# Patient Record
Sex: FEMALE | Race: OTHER | HISPANIC OR LATINO | URBAN - METROPOLITAN AREA
[De-identification: names, ages, dates, MRNs, and addresses within clinical notes are randomized per-mention and may not be internally consistent; named-entity substitution may affect disease eponyms.]

---

## 2019-08-11 ENCOUNTER — INPATIENT (INPATIENT)
Facility: HOSPITAL | Age: 24
LOS: 1 days | Discharge: HOME | End: 2019-08-13
Attending: INTERNAL MEDICINE | Admitting: INTERNAL MEDICINE
Payer: COMMERCIAL

## 2019-08-11 VITALS
SYSTOLIC BLOOD PRESSURE: 245 MMHG | WEIGHT: 175.05 LBS | TEMPERATURE: 97 F | RESPIRATION RATE: 22 BRPM | HEART RATE: 134 BPM | DIASTOLIC BLOOD PRESSURE: 128 MMHG | OXYGEN SATURATION: 100 % | HEIGHT: 64 IN

## 2019-08-11 DIAGNOSIS — E06.3 AUTOIMMUNE THYROIDITIS: Chronic | ICD-10-CM

## 2019-08-11 DIAGNOSIS — M32.9 SYSTEMIC LUPUS ERYTHEMATOSUS, UNSPECIFIED: Chronic | ICD-10-CM

## 2019-08-11 LAB
ALBUMIN SERPL ELPH-MCNC: 4 G/DL — SIGNIFICANT CHANGE UP (ref 3.5–5.2)
ALBUMIN SERPL ELPH-MCNC: 4.5 G/DL — SIGNIFICANT CHANGE UP (ref 3.5–5.2)
ALP SERPL-CCNC: 113 U/L — SIGNIFICANT CHANGE UP (ref 30–115)
ALP SERPL-CCNC: 129 U/L — HIGH (ref 30–115)
ALT FLD-CCNC: 28 U/L — SIGNIFICANT CHANGE UP (ref 0–41)
ALT FLD-CCNC: 30 U/L — SIGNIFICANT CHANGE UP (ref 0–41)
ANION GAP SERPL CALC-SCNC: 14 MMOL/L — SIGNIFICANT CHANGE UP (ref 7–14)
ANION GAP SERPL CALC-SCNC: 15 MMOL/L — HIGH (ref 7–14)
APAP SERPL-MCNC: <5 UG/ML — LOW (ref 10–30)
APPEARANCE UR: CLEAR — SIGNIFICANT CHANGE UP
APPEARANCE UR: CLEAR — SIGNIFICANT CHANGE UP
AST SERPL-CCNC: 23 U/L — SIGNIFICANT CHANGE UP (ref 0–41)
AST SERPL-CCNC: 48 U/L — HIGH (ref 0–41)
BACTERIA # UR AUTO: NEGATIVE — SIGNIFICANT CHANGE UP
BACTERIA # UR AUTO: NEGATIVE — SIGNIFICANT CHANGE UP
BASOPHILS # BLD AUTO: 0.05 K/UL — SIGNIFICANT CHANGE UP (ref 0–0.2)
BASOPHILS # BLD AUTO: 0.09 K/UL — SIGNIFICANT CHANGE UP (ref 0–0.2)
BASOPHILS NFR BLD AUTO: 0.4 % — SIGNIFICANT CHANGE UP (ref 0–1)
BASOPHILS NFR BLD AUTO: 0.6 % — SIGNIFICANT CHANGE UP (ref 0–1)
BILIRUB SERPL-MCNC: 0.3 MG/DL — SIGNIFICANT CHANGE UP (ref 0.2–1.2)
BILIRUB SERPL-MCNC: 0.3 MG/DL — SIGNIFICANT CHANGE UP (ref 0.2–1.2)
BILIRUB UR-MCNC: NEGATIVE — SIGNIFICANT CHANGE UP
BILIRUB UR-MCNC: NEGATIVE — SIGNIFICANT CHANGE UP
BUN SERPL-MCNC: 12 MG/DL — SIGNIFICANT CHANGE UP (ref 10–20)
BUN SERPL-MCNC: 16 MG/DL — SIGNIFICANT CHANGE UP (ref 10–20)
CALCIUM SERPL-MCNC: 10 MG/DL — SIGNIFICANT CHANGE UP (ref 8.5–10.1)
CALCIUM SERPL-MCNC: 9.4 MG/DL — SIGNIFICANT CHANGE UP (ref 8.5–10.1)
CHLORIDE SERPL-SCNC: 101 MMOL/L — SIGNIFICANT CHANGE UP (ref 98–110)
CHLORIDE SERPL-SCNC: 102 MMOL/L — SIGNIFICANT CHANGE UP (ref 98–110)
CO2 SERPL-SCNC: 23 MMOL/L — SIGNIFICANT CHANGE UP (ref 17–32)
CO2 SERPL-SCNC: 24 MMOL/L — SIGNIFICANT CHANGE UP (ref 17–32)
COLOR SPEC: COLORLESS — SIGNIFICANT CHANGE UP
COLOR SPEC: COLORLESS — SIGNIFICANT CHANGE UP
CREAT SERPL-MCNC: 0.7 MG/DL — SIGNIFICANT CHANGE UP (ref 0.7–1.5)
CREAT SERPL-MCNC: 0.9 MG/DL — SIGNIFICANT CHANGE UP (ref 0.7–1.5)
DIFF PNL FLD: SIGNIFICANT CHANGE UP
DIFF PNL FLD: SIGNIFICANT CHANGE UP
EOSINOPHIL # BLD AUTO: 0.01 K/UL — SIGNIFICANT CHANGE UP (ref 0–0.7)
EOSINOPHIL # BLD AUTO: 0.05 K/UL — SIGNIFICANT CHANGE UP (ref 0–0.7)
EOSINOPHIL NFR BLD AUTO: 0.1 % — SIGNIFICANT CHANGE UP (ref 0–8)
EOSINOPHIL NFR BLD AUTO: 0.4 % — SIGNIFICANT CHANGE UP (ref 0–8)
EPI CELLS # UR: 1 /HPF — SIGNIFICANT CHANGE UP (ref 0–5)
EPI CELLS # UR: 2 /HPF — SIGNIFICANT CHANGE UP (ref 0–5)
ERYTHROCYTE [SEDIMENTATION RATE] IN BLOOD: 17 MM/HR — SIGNIFICANT CHANGE UP (ref 0–20)
ETHANOL SERPL-MCNC: <10 MG/DL — SIGNIFICANT CHANGE UP
ETHANOL SERPL-MCNC: <10 MG/DL — SIGNIFICANT CHANGE UP
GLUCOSE SERPL-MCNC: 101 MG/DL — HIGH (ref 70–99)
GLUCOSE SERPL-MCNC: 116 MG/DL — HIGH (ref 70–99)
GLUCOSE UR QL: NEGATIVE — SIGNIFICANT CHANGE UP
GLUCOSE UR QL: NEGATIVE — SIGNIFICANT CHANGE UP
HCG UR QL: NEGATIVE — SIGNIFICANT CHANGE UP
HCT VFR BLD CALC: 42.5 % — SIGNIFICANT CHANGE UP (ref 37–47)
HCT VFR BLD CALC: 43.9 % — SIGNIFICANT CHANGE UP (ref 37–47)
HGB BLD-MCNC: 14.1 G/DL — SIGNIFICANT CHANGE UP (ref 12–16)
HGB BLD-MCNC: 14.2 G/DL — SIGNIFICANT CHANGE UP (ref 12–16)
HYALINE CASTS # UR AUTO: 1 /LPF — SIGNIFICANT CHANGE UP (ref 0–7)
HYALINE CASTS # UR AUTO: 1 /LPF — SIGNIFICANT CHANGE UP (ref 0–7)
IMM GRANULOCYTES NFR BLD AUTO: 0.4 % — HIGH (ref 0.1–0.3)
IMM GRANULOCYTES NFR BLD AUTO: 0.4 % — HIGH (ref 0.1–0.3)
KETONES UR-MCNC: SIGNIFICANT CHANGE UP
KETONES UR-MCNC: SIGNIFICANT CHANGE UP
LEUKOCYTE ESTERASE UR-ACNC: NEGATIVE — SIGNIFICANT CHANGE UP
LEUKOCYTE ESTERASE UR-ACNC: NEGATIVE — SIGNIFICANT CHANGE UP
LYMPHOCYTES # BLD AUTO: 16.2 % — LOW (ref 20.5–51.1)
LYMPHOCYTES # BLD AUTO: 2.07 K/UL — SIGNIFICANT CHANGE UP (ref 1.2–3.4)
LYMPHOCYTES # BLD AUTO: 2.96 K/UL — SIGNIFICANT CHANGE UP (ref 1.2–3.4)
LYMPHOCYTES # BLD AUTO: 20.7 % — SIGNIFICANT CHANGE UP (ref 20.5–51.1)
MAGNESIUM SERPL-MCNC: 2 MG/DL — SIGNIFICANT CHANGE UP (ref 1.8–2.4)
MCHC RBC-ENTMCNC: 28.5 PG — SIGNIFICANT CHANGE UP (ref 27–31)
MCHC RBC-ENTMCNC: 29.1 PG — SIGNIFICANT CHANGE UP (ref 27–31)
MCHC RBC-ENTMCNC: 32.3 G/DL — SIGNIFICANT CHANGE UP (ref 32–37)
MCHC RBC-ENTMCNC: 33.2 G/DL — SIGNIFICANT CHANGE UP (ref 32–37)
MCV RBC AUTO: 87.8 FL — SIGNIFICANT CHANGE UP (ref 81–99)
MCV RBC AUTO: 88.2 FL — SIGNIFICANT CHANGE UP (ref 81–99)
MONOCYTES # BLD AUTO: 0.68 K/UL — HIGH (ref 0.1–0.6)
MONOCYTES # BLD AUTO: 0.99 K/UL — HIGH (ref 0.1–0.6)
MONOCYTES NFR BLD AUTO: 5.3 % — SIGNIFICANT CHANGE UP (ref 1.7–9.3)
MONOCYTES NFR BLD AUTO: 6.9 % — SIGNIFICANT CHANGE UP (ref 1.7–9.3)
NEUTROPHILS # BLD AUTO: 10.14 K/UL — HIGH (ref 1.4–6.5)
NEUTROPHILS # BLD AUTO: 9.91 K/UL — HIGH (ref 1.4–6.5)
NEUTROPHILS NFR BLD AUTO: 71 % — SIGNIFICANT CHANGE UP (ref 42.2–75.2)
NEUTROPHILS NFR BLD AUTO: 77.6 % — HIGH (ref 42.2–75.2)
NITRITE UR-MCNC: NEGATIVE — SIGNIFICANT CHANGE UP
NITRITE UR-MCNC: NEGATIVE — SIGNIFICANT CHANGE UP
NRBC # BLD: 0 /100 WBCS — SIGNIFICANT CHANGE UP (ref 0–0)
NRBC # BLD: 0 /100 WBCS — SIGNIFICANT CHANGE UP (ref 0–0)
PH UR: 8 — SIGNIFICANT CHANGE UP (ref 5–8)
PH UR: 8 — SIGNIFICANT CHANGE UP (ref 5–8)
PLATELET # BLD AUTO: 328 K/UL — SIGNIFICANT CHANGE UP (ref 130–400)
PLATELET # BLD AUTO: 356 K/UL — SIGNIFICANT CHANGE UP (ref 130–400)
POTASSIUM SERPL-MCNC: 4.1 MMOL/L — SIGNIFICANT CHANGE UP (ref 3.5–5)
POTASSIUM SERPL-MCNC: 5.8 MMOL/L — HIGH (ref 3.5–5)
POTASSIUM SERPL-SCNC: 4.1 MMOL/L — SIGNIFICANT CHANGE UP (ref 3.5–5)
POTASSIUM SERPL-SCNC: 5.8 MMOL/L — HIGH (ref 3.5–5)
PROT SERPL-MCNC: 7.8 G/DL — SIGNIFICANT CHANGE UP (ref 6–8)
PROT SERPL-MCNC: 7.8 G/DL — SIGNIFICANT CHANGE UP (ref 6–8)
PROT UR-MCNC: ABNORMAL
PROT UR-MCNC: ABNORMAL
RBC # BLD: 4.84 M/UL — SIGNIFICANT CHANGE UP (ref 4.2–5.4)
RBC # BLD: 4.98 M/UL — SIGNIFICANT CHANGE UP (ref 4.2–5.4)
RBC # FLD: 12.3 % — SIGNIFICANT CHANGE UP (ref 11.5–14.5)
RBC # FLD: 12.4 % — SIGNIFICANT CHANGE UP (ref 11.5–14.5)
RBC CASTS # UR COMP ASSIST: 5 /HPF — HIGH (ref 0–4)
RBC CASTS # UR COMP ASSIST: 5 /HPF — HIGH (ref 0–4)
SALICYLATES SERPL-MCNC: <0.3 MG/DL — LOW (ref 4–30)
SODIUM SERPL-SCNC: 139 MMOL/L — SIGNIFICANT CHANGE UP (ref 135–146)
SODIUM SERPL-SCNC: 140 MMOL/L — SIGNIFICANT CHANGE UP (ref 135–146)
SP GR SPEC: 1.01 — LOW (ref 1.01–1.02)
SP GR SPEC: 1.01 — SIGNIFICANT CHANGE UP (ref 1.01–1.02)
TROPONIN T SERPL-MCNC: 0.02 NG/ML — HIGH
TROPONIN T SERPL-MCNC: 0.02 NG/ML — HIGH
UROBILINOGEN FLD QL: SIGNIFICANT CHANGE UP
UROBILINOGEN FLD QL: SIGNIFICANT CHANGE UP
WBC # BLD: 12.77 K/UL — HIGH (ref 4.8–10.8)
WBC # BLD: 14.28 K/UL — HIGH (ref 4.8–10.8)
WBC # FLD AUTO: 12.77 K/UL — HIGH (ref 4.8–10.8)
WBC # FLD AUTO: 14.28 K/UL — HIGH (ref 4.8–10.8)
WBC UR QL: 1 /HPF — SIGNIFICANT CHANGE UP (ref 0–5)
WBC UR QL: 1 /HPF — SIGNIFICANT CHANGE UP (ref 0–5)

## 2019-08-11 PROCEDURE — 70544 MR ANGIOGRAPHY HEAD W/O DYE: CPT | Mod: 26,59

## 2019-08-11 PROCEDURE — 70450 CT HEAD/BRAIN W/O DYE: CPT | Mod: 26

## 2019-08-11 PROCEDURE — 99223 1ST HOSP IP/OBS HIGH 75: CPT

## 2019-08-11 PROCEDURE — 70549 MR ANGIOGRAPH NECK W/O&W/DYE: CPT | Mod: 26

## 2019-08-11 PROCEDURE — 70551 MRI BRAIN STEM W/O DYE: CPT | Mod: 26

## 2019-08-11 PROCEDURE — 93306 TTE W/DOPPLER COMPLETE: CPT | Mod: 26

## 2019-08-11 PROCEDURE — 93010 ELECTROCARDIOGRAM REPORT: CPT

## 2019-08-11 PROCEDURE — 71045 X-RAY EXAM CHEST 1 VIEW: CPT | Mod: 26

## 2019-08-11 PROCEDURE — 99291 CRITICAL CARE FIRST HOUR: CPT

## 2019-08-11 RX ORDER — ONDANSETRON 8 MG/1
4 TABLET, FILM COATED ORAL EVERY 8 HOURS
Refills: 0 | Status: DISCONTINUED | OUTPATIENT
Start: 2019-08-11 | End: 2019-08-13

## 2019-08-11 RX ORDER — NICARDIPINE HYDROCHLORIDE 30 MG/1
5 CAPSULE, EXTENDED RELEASE ORAL
Qty: 40 | Refills: 0 | Status: DISCONTINUED | OUTPATIENT
Start: 2019-08-11 | End: 2019-08-12

## 2019-08-11 RX ORDER — LABETALOL HCL 100 MG
100 TABLET ORAL EVERY 8 HOURS
Refills: 0 | Status: DISCONTINUED | OUTPATIENT
Start: 2019-08-11 | End: 2019-08-13

## 2019-08-11 RX ORDER — NOREPINEPHRINE BITARTRATE/D5W 8 MG/250ML
0.05 PLASTIC BAG, INJECTION (ML) INTRAVENOUS
Qty: 16 | Refills: 0 | Status: DISCONTINUED | OUTPATIENT
Start: 2019-08-11 | End: 2019-08-11

## 2019-08-11 RX ORDER — HEPARIN SODIUM 5000 [USP'U]/ML
5000 INJECTION INTRAVENOUS; SUBCUTANEOUS EVERY 12 HOURS
Refills: 0 | Status: DISCONTINUED | OUTPATIENT
Start: 2019-08-11 | End: 2019-08-13

## 2019-08-11 RX ADMIN — HEPARIN SODIUM 5000 UNIT(S): 5000 INJECTION INTRAVENOUS; SUBCUTANEOUS at 18:23

## 2019-08-11 RX ADMIN — Medication 100 MILLIGRAM(S): at 08:05

## 2019-08-11 RX ADMIN — Medication 100 MILLIGRAM(S): at 13:57

## 2019-08-11 RX ADMIN — NICARDIPINE HYDROCHLORIDE 25 MG/HR: 30 CAPSULE, EXTENDED RELEASE ORAL at 05:45

## 2019-08-11 RX ADMIN — Medication 100 MILLIGRAM(S): at 21:23

## 2019-08-11 NOTE — H&P ADULT - NSHPREVIEWOFSYSTEMS_GEN_ALL_CORE

## 2019-08-11 NOTE — H&P ADULT - NSHPLABSRESULTS_GEN_ALL_CORE
LABS:                       14.1   14.28 )-----------( 328      ( 11 Aug 2019 04:07 )             42.5       08-11    139  |  101  |  16  ----------------------------<  116<H>  5.8<H>   |  23  |  0.9    Ca    9.4      11 Aug 2019 04:07    TPro  7.8  /  Alb  4.0  /  TBili  0.3  /  DBili  x   /  AST  48<H>  /  ALT  30  /  AlkPhos  113  08-11    Lactate Trend    CAPILLARY BLOOD GLUCOSE      RADIOLOGY:    < from: CT Head No Cont (08.11.19 @ 04:51) >  IMPRESSION:  No CT evidence of acute intracranial pathology.        EKGS:

## 2019-08-11 NOTE — H&P ADULT - ASSESSMENT
A 24y female with no significant PMH presents to the hospital complaining of right facial numbness. Currently, patient is admitted for hypertensive emergency requiring nicardipine gtt and ICU is consulted.     IMPRESSION:  Hypertensive emergency    PLAN:    CNS:  - avoid sedation  - f/u MRI     HEENT:  - oral care    PULMONARY:  - keep head between 30 - 45 degrees    CARDIOVASCULAR:  - trend CE x 3   --  rx:  nitro gtt or nicardipine gtt (should not be used without hr control first with bb)  --  restart home bp medications  --  urgency: nicardipine 20-40 q8 or captopril [id number] or labetalol 200 q6-12     GI: GI prophylaxis.  Feeding     RENAL:  - f/u UA and UO     INFECTIOUS DISEASE:    HEMATOLOGICAL:  DVT prophylaxis.    ENDOCRINE:  Follow up FS.  Insulin protocol if needed.    MUSCULOSKELETAL:    TIME SPENT:      GI ppx: (  )  DVT ppx: (  )  Activity:  Code status: Full Code (  ) / DNR (  ) / DNI (  )  Disposition:     (  ) Discussion with patient and/or family regarding goals of care  (  ) Discussed Case and Plan with Medical Attending, Name:  A 24y female with no significant PMH presents to the hospital complaining of right facial numbness. Currently, patient is admitted for hypertensive emergency requiring nicardipine gtt and ICU is consulted.     IMPRESSION:  Hypertensive emergency    PLAN:    CNS:  - avoid sedation  - f/u MRI     HEENT:  - oral care    PULMONARY:  - keep head between 30 - 45 degrees    CARDIOVASCULAR:  - trend CE x 3   - c/w nicardipine gtt to maintain SBP < 180   - begin labetolol PO 100mg q8h and increase PRN     GI:   - GI prophylaxis  - Feeding     RENAL:  - f/u UA and UO   - f/u rine metanephrine   - f/u urine tox     INFECTIOUS DISEASE:  - f/u pancultures     HEMATOLOGICAL:    - DVT prophylaxis.    ENDOCRINE:    - Follow up FS  - Insulin protocol if needed.    MUSCULOSKELETAL:  - activity increase as tolerated    ( X ) Discussion with patient and/or family regarding goals of care IMPRESSION:  Hypertensive emergency improving     PLAN:    CNS:  - avoid sedation  - f/u MRI.  FU with neuro     HEENT:  - oral care    PULMONARY:  - keep head between 30 - 45 degrees    CARDIOVASCULAR:  - trend CE x 3   - c/w nicardipine gtt  - begin labetolol PO 100mg q8h     GI:   - GI prophylaxis  - Feeding     RENAL:  - f/u UA and UO   - f/u Urine metanephrine   - f/u urine tox   - Renal eval    INFECTIOUS DISEASE:  - f/u pancultures     HEMATOLOGICAL:    - DVT prophylaxis.    ENDOCRINE:    - Follow up FS  - Insulin protocol if needed.    MUSCULOSKELETAL:  - activity increase as tolerated once BP controlled     MICU for now IMPRESSION:  Hypertensive emergency improving     PLAN:    CNS:  - avoid sedation  - f/u MRI.  FU with neuro     HEENT:  - oral care    PULMONARY:  - keep head between 30 - 45 degrees.  CXR     CARDIOVASCULAR:  - trend CE x 3   - c/w nicardipine gtt  - begin labetolol PO 100mg q8h   - ECHO    GI:   - GI prophylaxis  - Feeding     RENAL:  - f/u UA and UO   - f/u Urine metanephrine   - f/u urine tox   - Renal eval    INFECTIOUS DISEASE:  - f/u pancultures     HEMATOLOGICAL:    - DVT prophylaxis.    ENDOCRINE:    - Follow up FS  - Insulin protocol if needed.    MUSCULOSKELETAL:  - activity increase as tolerated once BP controlled     MICU for now

## 2019-08-11 NOTE — ED ADULT TRIAGE NOTE - CHIEF COMPLAINT QUOTE
Patient states she feels a tingling on the right side of her face and nose since friday afternoon. Patient states she has right upper wisdom teeth that are impacted Patient states she feels a tingling on the right side of her face and nose since friday afternoon. Patient states she has right upper wisdom teeth that are impacted. Patient states she is extremely nervous. Patient states she feels a tingling on the right side of her face and nose since friday afternoon. Patient states she has right upper wisdom teeth that are impacted. Patient states she is extremely nervous. No neurological deficits noted.

## 2019-08-11 NOTE — CONSULT NOTE ADULT - SUBJECTIVE AND OBJECTIVE BOX
Neurology Consult    Patient is a 24y old  Female who presents with a chief complaint of HTN and Right facial numbness    HPI:24F with no significant pmh presents with R facial numbness beginning 2 days ago. Denies other focal numbness or weakness. Does admit to hx of impacted molars bilaterally. Denies fever, chills, n/v/d, CP, SOB, LE pain or swelling. Pt states her facial numbness started friday intermittantly when it returned yesterday she went to hospital, pt denies headaches, blurry vision, numbness /tingling or weakness in the extremities. Pt is currently in ER critical care area on Cardene at 5 for SBP in 250's. Pt SBP came down to 197 but then returned to 225. Pt is currently asymptomatic    PAST MEDICAL & SURGICAL HISTORY:  Impacted molar      FAMILY HISTORY:  Lupus  Hashimoto's disease  Sjorgourn    Social History: (-) x 3    Allergies    Sulfacetamide Sodium (Unknown)    Intolerances        MEDICATIONS  (STANDING):  niCARdipine Infusion 5 mG/Hr (25 mL/Hr) IV Continuous <Continuous>    MEDICATIONS  (PRN):      Review of systems:    Constitutional: No fever, weight loss or fatigue    Eyes: No eye pain or discharge  ENMT:  No difficulty hearing; No sinus or throat pain  Neck: No pain or stiffness  Respiratory: No cough, wheezing, chills or hemoptysis  Cardiovascular: No chest pain, palpitations, shortness of breath, dyspnea on exertion  Gastrointestinal: No abdominal pain, nausea, vomiting or hematemesis; No diarrhea or constipation.   Genitourinary: No dysuria, frequency, hematuria or incontinence  Neurological: As per HPI  Skin: No rashes or lesions   Endocrine: No heat or cold intolerance; No hair loss  Musculoskeletal: No joint pain or swelling  Psychiatric: No depression, anxiety, mood swings  Heme/Lymph: No easy bruising or bleeding gums    Vital Signs Last 24 Hrs  T(C): 36.3 (11 Aug 2019 03:29), Max: 36.3 (11 Aug 2019 03:29)  T(F): 97.4 (11 Aug 2019 03:29), Max: 97.4 (11 Aug 2019 03:29)  HR: 124 (11 Aug 2019 06:50) (112 - 143)  BP: 162/81 (11 Aug 2019 06:50) (162/81 - 245/128)  BP(mean): --  RR: 16 (11 Aug 2019 06:50) (16 - 22)  SpO2: 99% (11 Aug 2019 06:50) (98% - 100%)    Neurologic Examination:  General:  Appearance is consistent with chronologic age.  No abnormal facies.   General: The patient is oriented to person, place, time and date.  Recent and remote memory intact.  Fund of knowledge is intact and normal.  Language with normal repetition, comprehension and naming.  Nondysarthric.    Cranial nerves: intact VA, VFF.  EOMI w/o nystagmus, skew or reported double vision.  PERRL.  No ptosis/weakness of eyelid closure.  Facial sensation is normal with normal bite.  No facial asymmetry.  Hearing grossly intact b/l.  Palate elevates midline.  Tongue midline.  Motor examination:   Normal tone, bulk and range of motion.  No tenderness, twitching, tremors or involuntary movements.  Formal Muscle Strength Testing: (MRC grade R/L) 5/5 UE; 5/5 LE.  No observable drift.  Reflexes:   2+ b/l pectoralis, biceps, triceps, brachioradialis, patella and Achilles.  Plantar response downgoing b/l.  Jaw jerk, Amanuel, clonus absent.  Sensory examination:   Intact to light touch and pinprick, pain, temperature and proprioception and vibration in all extremities.  Cerebellum:   FTN/HKS intact with normal PO in all limbs.  No dysmetria or dysdiadokinesia.  Gait narrow based and normal.    NIH 0 GCS 15  Labs:   CBC Full  -  ( 11 Aug 2019 04:07 )  WBC Count : 14.28 K/uL  RBC Count : 4.84 M/uL  Hemoglobin : 14.1 g/dL  Hematocrit : 42.5 %  Platelet Count - Automated : 328 K/uL  Mean Cell Volume : 87.8 fL  Mean Cell Hemoglobin : 29.1 pg  Mean Cell Hemoglobin Concentration : 33.2 g/dL  Auto Neutrophil # : 10.14 K/uL  Auto Lymphocyte # : 2.96 K/uL  Auto Monocyte # : 0.99 K/uL  Auto Eosinophil # : 0.05 K/uL  Auto Basophil # : 0.09 K/uL  Auto Neutrophil % : 71.0 %  Auto Lymphocyte % : 20.7 %  Auto Monocyte % : 6.9 %  Auto Eosinophil % : 0.4 %  Auto Basophil % : 0.6 %    08-11    139  |  101  |  16  ----------------------------<  116<H>  5.8<H>   |  23  |  0.9    Ca    9.4      11 Aug 2019 04:07    TPro  7.8  /  Alb  4.0  /  TBili  0.3  /  DBili  x   /  AST  48<H>  /  ALT  30  /  AlkPhos  113  08-11    LIVER FUNCTIONS - ( 11 Aug 2019 04:07 )  Alb: 4.0 g/dL / Pro: 7.8 g/dL / ALK PHOS: 113 U/L / ALT: 30 U/L / AST: 48 U/L / GGT: x                   Neuroimaging:  NCHCT: CT Head No Cont:   EXAM:  CT BRAIN            PROCEDURE DATE:  08/11/2019            INTERPRETATION:  CLINICAL HISTORY / REASON FOR EXAM: Right facial numbness    COMPARISON: None     TECHNIQUE: Multiple axial CT images of the head were obtained with   sagittal and coronal reformats from the base of the skull to the vertex   without the administration of IV contrast.      FINDINGS:    The ventricles and cerebral sulci are age-appropriate.    There is no evidence of acute intracranial hemorrhage, mass effect or   midline shift.    Gray-white differentiation is maintained.    There is no fracture to the calvarium. Mastoid air cells are well aerated   bilaterally. The visualized portions of the sinuses are unremarkable.      IMPRESSION:    No CT evidence of acute intracranial pathology.              JOVANY MCCRAY M.D., RESIDENT RADIOLOGIST  This document has been electronically signed.  BIANCA SERNA M.D., ATTENDING RADIOLOGIST  This document has been electronically signed. Aug 11 2019  5:07AM             (08-11-19 @ 04:51)    CT Angiography/Perfusion:  MRI Brain NC:  MRA Head/Neck:  EEG:    Assessment:  This is a 24y Female with h/o     Plan:   -   08-11-19 @ 07:01

## 2019-08-11 NOTE — H&P ADULT - HISTORY OF PRESENT ILLNESS
25 yo F w/ no significant PMH presents with Rt facial numbness beginning 2 days ago. Denies other focal numbness or weakness. Admits to hx of impacted molars bilaterally. Denies fever, chills, n/v/d, CP, SOB, LE pain or swelling, recent travel or exogenous hormone use.    Family hx positive for Lupus, Sjogren's disease (both, mother), and Hashimoto's (aunt)    In the ED, pt was found to be hypertensive at 245/128. Neurology consulted and recommended CTH and MRI head. CTH negative for acute pathology. MRI pending. Nicardipine gtt started and BP decreased and sxs resolved. ICU consulted for hypertensive emergency requiring nicardipine gtt.     Vital Signs Last 24 Hrs  T(C): 36.3 (11 Aug 2019 03:29), Max: 36.3 (11 Aug 2019 03:29)  T(F): 97.4 (11 Aug 2019 03:29), Max: 97.4 (11 Aug 2019 03:29)  HR: 124 (11 Aug 2019 06:30) (112 - 143)  BP: 174/83 (11 Aug 2019 06:30) (174/83 - 245/128)  BP(mean): --  RR: 16 (11 Aug 2019 06:30) (16 - 22)  SpO2: 98% (11 Aug 2019 06:30) (98% - 100%) 23 yo F w/ no significant PMH presents with Rt facial numbness beginning 2 days ago. Denies other focal numbness or weakness. Admits to hx of impacted molars bilaterally. Had hx of workup for lupus which was negative. Denies fever, chills, CP, SOB, LE pain or swelling, recent travel or exogenous hormone use. Denies malar rash.    Family hx positive for Lupus, Sjogren's disease (both, mother), and Hashimoto's (aunt)    In the ED, pt was found to be hypertensive at 245/128. Neurology consulted and recommended CTH and MRI head. CTH negative for acute pathology. MRI pending. Nicardipine gtt started and BP decreased and sxs resolved. ICU consulted for hypertensive emergency requiring nicardipine gtt.     Vital Signs Last 24 Hrs  T(C): 36.3 (11 Aug 2019 03:29), Max: 36.3 (11 Aug 2019 03:29)  T(F): 97.4 (11 Aug 2019 03:29), Max: 97.4 (11 Aug 2019 03:29)  HR: 124 (11 Aug 2019 06:30) (112 - 143)  BP: 174/83 (11 Aug 2019 06:30) (174/83 - 245/128)  BP(mean): --  RR: 16 (11 Aug 2019 06:30) (16 - 22)  SpO2: 98% (11 Aug 2019 06:30) (98% - 100%)

## 2019-08-11 NOTE — ED ADULT NURSE NOTE - NSIMPLEMENTINTERV_GEN_ALL_ED
Implemented All Universal Safety Interventions:  [place] to call system. Call bell, personal items and telephone within reach. Instruct patient to call for assistance. Room bathroom lighting operational. Non-slip footwear when patient is off stretcher. Physically safe environment: no spills, clutter or unnecessary equipment. Stretcher in lowest position, wheels locked, appropriate side rails in place.

## 2019-08-11 NOTE — CONSULT NOTE ADULT - ASSESSMENT
IMP: 24 y?o F with HTN crisis r/o PRES          PLAN: MRI of the brain with MRA of Head and Neck today preferably ASAP - 8am the techs are in           Continue Cardene - control SBP to  190-180's for now          Neuro checks q 1        Check Urine Tox       Please include work up of VICTORIA, Pheo, causes of HTN crisis...

## 2019-08-11 NOTE — ED PROVIDER NOTE - ATTENDING CONTRIBUTION TO CARE
I personally evaluated patient. I agree with the findings and plan with all documentation on chart except as documented  in my note.    25 y/o F with no significant PMH who presents with R facial numbness beginning 2 days ago. Denies other focal numbness or weakness. Does admit to hx of impacted molars bilaterally. Denies fever, chills, n/v/d, CP, SOB, LE pain or swelling. Pt states her facial numbness started Friday intermittently when it returned yesterday she went to hospital, pt denies headaches, blurry vision, numbness /tingling or weakness in the extremities.     On exam, VS reviewed and patient has new and malignantly high BP. Patient is tachycardic to the 120-130's. Neuro exam is non-focal and normal.  Patient non-toxic appearing. LArge bore iV placed and labs sent.  Blood pressure rechecked multiple times and remained high. Pheochromocytoma and PRES both considered and Neuro called case discussed with Dr. Evans.  Patient upgraded to Critical Care ED and Nicardipine drip started. Patient had immediate improvement in 's/80's.  Initial goal was 25% reduction in BP.  Dr. Evans requests immediate MRI to evaluate for PRES>  Neuro evaluated patient art bedside. Her BP improved immediately with drip and is reliable, I do not feel patient requires jhon arterial line at this time. ICU consulted and recommend tele admission.  Will get immediate MRI.  Inpatient team to continue care and work up[. Patient and family spoken to about results and plan of care and urine metanephrines sent.

## 2019-08-11 NOTE — ED ADULT NURSE REASSESSMENT NOTE - NS ED NURSE REASSESS COMMENT FT1
As per MD, pt moved to critical care area for closer monitoring. Report given to KATE Fermin. Family at bedside. safety and comfort measures in place. will cont to monitor.

## 2019-08-11 NOTE — ED ADULT NURSE REASSESSMENT NOTE - NS ED NURSE REASSESS COMMENT FT1
[facility] pt's BP is elevated. pt denies symptoms of high blood pressure. denies headache, denies chest pain, denies palpitations, denies vision changes. pt reports coming to the ED for facial numbness. continues to experience facial numbness. second IV site initiated. pt started on cardene drip at 5 mg/hr.

## 2019-08-11 NOTE — ED PROVIDER NOTE - PROGRESS NOTE DETAILS
Pt signed out to me by Dr. Ramesh. Will follow up with neuro. Discussed with neurology. Requests stat MRI, cardene drip, urine tox, and ICU monitoring. Paged ICU. Awaiting call back. PT requiring higher level of care due to hypertensive urgency. PT upgraded to critical care, started on cardene drip and endorsed to Dr. Stringer.

## 2019-08-11 NOTE — ED PROVIDER NOTE - OBJECTIVE STATEMENT
24F with no significant pmh presents with R facial numbness beginning 2 days ago. Denies other focal numbness or weakness. Does admit to hx of impacted molars bilaterally. Denies fever, chills, n/v/d, CP, SOB, LE pain or swelling, recent travel or exogenous hormone use.  +Fhx of Lupus, Sjogren's disease (both, mother), and Hashimoto's (aunt) 24F with no significant pmh presents with R facial numbness beginning 2 days ago. Reports numbness as mild, located to right lip and cheek, non-radiating, and associated with tooth/molar tension. Denies other focal numbness or weakness. Does admit to hx of impacted molars bilaterally. Denies fever, chills, n/v/d, CP, SOB, LE pain or swelling, recent travel or exogenous hormone use.  +Fhx of Lupus, Sjogren's disease (both, mother), and Hashimoto's (aunt)

## 2019-08-11 NOTE — ED PROVIDER NOTE - CARE PLAN
Principal Discharge DX:	Hypertensive urgency Principal Discharge DX:	Hypertensive urgency  Secondary Diagnosis:	Numbness of face  Secondary Diagnosis:	PRES (posterior reversible encephalopathy syndrome)  Secondary Diagnosis:	Malignant hypertension

## 2019-08-11 NOTE — H&P ADULT - NSHPPHYSICALEXAM_GEN_ALL_CORE
PHYSICAL EXAM:  GENERAL: NAD, AAO x 4, 24y F  HEAD:  Atraumatic, Normocephalic  EYES: EOMI, conjunctiva and sclera white  NECK: Supple, No JVD  CHEST/LUNG: Clear to auscultation bilaterally; No wheeze; No crackles; No accessory muscles used  HEART: Regular rate and rhythm; No murmurs;   ABDOMEN: Soft, Nontender, Nondistended; Bowel sounds present; No guarding  EXTREMITIES:  2+ Peripheral Pulses, No cyanosis or edema  NEUROLOGY: non-focal PHYSICAL EXAM:  GENERAL: NAD, AAO x 4, 24y F  HEAD:  Atraumatic, Normocephalic  EYES: EOMI, conjunctiva clear and sclera white  NECK: Supple, No JVD  CHEST/LUNG: Clear to auscultation bilaterally; No wheeze; No crackles; No accessory muscles used  HEART: Regular rate and rhythm; No murmurs;   ABDOMEN: Soft, Nontender, Nondistended; Bowel sounds present; No guarding  EXTREMITIES:  2+ Peripheral Pulses, No cyanosis or edema  NEUROLOGY: Admits to numbness of right jaw

## 2019-08-11 NOTE — ED PROVIDER NOTE - PHYSICAL EXAMINATION
CONSTITUTIONAL: Well-developed; well-nourished; in no acute distress.   SKIN: warm, dry  HEAD: Normocephalic; atraumatic.  EYES: PERRL, EOMI, no conjunctival erythema  ENT: No nasal discharge; airway clear.  NECK: Supple; non tender.  CARD: S1, S2 normal; no murmurs, gallops, or rubs. Regular rate and rhythm.   RESP: No wheezes, rales or rhonchi.  ABD: soft ntnd  EXT: Normal ROM.  No clubbing, cyanosis or edema.   LYMPH: No acute cervical adenopathy.  NEURO: Alert, oriented, grossly unremarkable. Gait steady, no dysmetria, no pronator drift, speech clear, CN II-XII grossly intact with exception of R facial numbness over C3, negative romberg.  PSYCH: Cooperative, appropriate.

## 2019-08-11 NOTE — PROGRESS NOTE ADULT - SUBJECTIVE AND OBJECTIVE BOX
PGY I NOTE    LENGTH OF HOSPITAL STAY:      CHIEF COMPLAINT:Patient is a 24y old  Female who presents with a chief complaint of HTN crisis (11 Aug 2019 06:59)    HPI:HPI:  23 yo F w/ no significant PMH presents with Rt facial numbness beginning 2 days ago. Denies other focal numbness or weakness. Admits to hx of impacted molars bilaterally. Had hx of workup for lupus which was negative. Denies fever, chills, CP, SOB, LE pain or swelling, recent travel or exogenous hormone use. Denies malar rash.    Family hx positive for Lupus, Sjogren's disease (both, mother), and Hashimoto's (aunt)    In the ED, pt was found to be hypertensive at 245/128. Neurology consulted and recommended CTH and MRI head. CTH negative for acute pathology. MRI pending. Nicardipine gtt started and BP decreased and sxs resolved. ICU consulted for hypertensive emergency requiring nicardipine gtt.     Vital Signs Last 24 Hrs  T(C): 36.3 (11 Aug 2019 03:29), Max: 36.3 (11 Aug 2019 03:29)  T(F): 97.4 (11 Aug 2019 03:29), Max: 97.4 (11 Aug 2019 03:29)  HR: 124 (11 Aug 2019 06:30) (112 - 143)  BP: 174/83 (11 Aug 2019 06:30) (174/83 - 245/128)  BP(mean): --  RR: 16 (11 Aug 2019 06:30) (16 - 22)  SpO2: 98% (11 Aug 2019 06:30) (98% - 100%) (11 Aug 2019 06:50)    OVERNIGHT EVENTS/INTERVAL UPDATES:  patient is sitting comfortably in bed, no acute events overnight, pt stable on nicardipine drip, planned for MRI of n  PMH & PSH  PAST MEDICAL & SURGICAL HISTORY:    SOCIAL HISTORY: Negative    ALLERGIES: Sulfacetamide Sodium (Unknown)    HOME MEDICATIONS  Home Medications:    PHYSICAL EXAM:  T(F): 97.8 (19 @ 12:15), Max: 98.3 (19 @ 07:00)  HR: 108 (19 @ 13:00)  BP: 169/93 (19 @ 13:00)  RR: 24 (19 @ 13:00)  SpO2: 99% (19 @ 13:00)  CAPILLARY BLOOD GLUCOSE          19 @ 07:01  -  19 @ 14:13  --------------------------------------------------------  IN:    niCARdipine Infusion: 25 mL  Total IN: 25 mL    OUT:  Total OUT: 0 mL    Total NET: 25 mL            General: NAD  HEENT: NCAT  CV: RRR  RESP: CTAB  Abdominal: Soft, NTTP  Extremity: no c/c/e  Neuro: A&O x3    MEDICATIONS  STANDING MEDICATIONS  heparin  Injectable 5000 Unit(s) SubCutaneous every 12 hours  labetalol 100 milliGRAM(s) Oral every 8 hours  niCARdipine Infusion 5 mG/Hr IV Continuous <Continuous>    PRN MEDICATIONS  ondansetron    Tablet 4 milliGRAM(s) Oral every 8 hours PRN    LABS:                        14.2   12.77 )-----------( 356      ( 11 Aug 2019 11:20 )             43.9                  140  |  102  |  12  ----------------------------<  101<H>  4.1   |  24  |  0.7    Ca    10.0      11 Aug 2019 11:20  Mg     2.0         TPro  7.8  /  Alb  4.5  /  TBili  0.3  /  DBili  x   /  AST  23  /  ALT  28  /  AlkPhos  129<H>      LIVER FUNCTIONS - ( 11 Aug 2019 11:20 )  Alb: 4.5 g/dL / Pro: 7.8 g/dL / ALK PHOS: 129 U/L / ALT: 28 U/L / AST: 23 U/L / GGT: x                        CARDIAC MARKERS ( 11 Aug 2019 11:20 )  x     / 0.02 ng/mL / x     / x     / x                        Urinalysis Basic - ( 11 Aug 2019 09:12 )    Color: Colorless / Appearance: Clear / S.010 / pH: x  Gluc: x / Ketone: Trace  / Bili: Negative / Urobili: <2 mg/dL   Blood: x / Protein: 100 mg/dL / Nitrite: Negative   Leuk Esterase: Negative / RBC: 5 /HPF / WBC 1 /HPF   Sq Epi: x / Non Sq Epi: 2 /HPF / Bacteria: Negative        IMAGING:      ASSESSMENT & PLANS:    CNS:    HEENT:    PULMONARY:    CARIOVASCULAR:    GI:    RENAL:    INFECTIOUS DISEASE:    HEMATOLOGICAL:    ENDOCRINE:    MUSCULOSKELETAL: PGY I NOTE    LENGTH OF HOSPITAL STAY:      CHIEF COMPLAINT:Patient is a 24y old  Female who presents with a chief complaint of HTN crisis (11 Aug 2019 06:59)    HPI:HPI:  25 yo F w/ no significant PMH presents with Rt facial numbness beginning 2 days ago. Denies other focal numbness or weakness. Admits to hx of impacted molars bilaterally. Had hx of workup for lupus which was negative. Denies fever, chills, CP, SOB, LE pain or swelling, recent travel or exogenous hormone use. Denies malar rash.    Family hx positive for Lupus, Sjogren's disease (both, mother), and Hashimoto's (aunt)    In the ED, pt was found to be hypertensive at 245/128. Neurology consulted and recommended CTH and MRI head. CTH negative for acute pathology. MRI pending. Nicardipine gtt started and BP decreased and sxs resolved. ICU consulted for hypertensive emergency requiring nicardipine gtt.     Vital Signs Last 24 Hrs  T(C): 36.3 (11 Aug 2019 03:29), Max: 36.3 (11 Aug 2019 03:29)  T(F): 97.4 (11 Aug 2019 03:29), Max: 97.4 (11 Aug 2019 03:29)  HR: 124 (11 Aug 2019 06:30) (112 - 143)  BP: 174/83 (11 Aug 2019 06:30) (174/83 - 245/128)  BP(mean): --  RR: 16 (11 Aug 2019 06:30) (16 - 22)  SpO2: 98% (11 Aug 2019 06:30) (98% - 100%) (11 Aug 2019 06:50)    OVERNIGHT EVENTS/INTERVAL UPDATES:  patient is sitting comfortably in bed, no acute events overnight, pt stable on nicardipine drip, planned for MR head and neck  today  denied any chest pain, sob, abdominal pain, nausea, vomiting, weakness or headache, negative on ros  PMH & PSH  PAST MEDICAL & SURGICAL HISTORY:    SOCIAL HISTORY: Negative    ALLERGIES: Sulfacetamide Sodium (Unknown)    HOME MEDICATIONS  Home Medications:    PHYSICAL EXAM:  T(F): 97.8 (19 @ 12:15), Max: 98.3 (19 @ 07:00)  HR: 108 (19 @ 13:00)  BP: 169/93 (19 @ 13:00)  RR: 24 (19 @ 13:00)  SpO2: 99% (19 @ 13:00)  CAPILLARY BLOOD GLUCOSE          19 @ 07:01  -  19 @ 14:13  --------------------------------------------------------  IN:    niCARdipine Infusion: 25 mL  Total IN: 25 mL    OUT:  Total OUT: 0 mL    Total NET: 25 mL            General: NAD  HEENT: NCAT  CV: RRR  RESP: CTAB  Abdominal: Soft, NTTP  Extremity: no c/c/e  Neuro: A&O x3    MEDICATIONS  STANDING MEDICATIONS  heparin  Injectable 5000 Unit(s) SubCutaneous every 12 hours  labetalol 100 milliGRAM(s) Oral every 8 hours  niCARdipine Infusion 5 mG/Hr IV Continuous <Continuous>    PRN MEDICATIONS  ondansetron    Tablet 4 milliGRAM(s) Oral every 8 hours PRN    LABS:                        14.2   12.77 )-----------( 356      ( 11 Aug 2019 11:20 )             43.9              08    140  |  102  |  12  ----------------------------<  101<H>  4.1   |  24  |  0.7    Ca    10.0      11 Aug 2019 11:20  Mg     2.0         TPro  7.8  /  Alb  4.5  /  TBili  0.3  /  DBili  x   /  AST  23  /  ALT  28  /  AlkPhos  129<H>      LIVER FUNCTIONS - ( 11 Aug 2019 11:20 )  Alb: 4.5 g/dL / Pro: 7.8 g/dL / ALK PHOS: 129 U/L / ALT: 28 U/L / AST: 23 U/L / GGT: x                        CARDIAC MARKERS ( 11 Aug 2019 11:20 )  x     / 0.02 ng/mL / x     / x     / x                        Urinalysis Basic - ( 11 Aug 2019 09:12 )    Color: Colorless / Appearance: Clear / S.010 / pH: x  Gluc: x / Ketone: Trace  / Bili: Negative / Urobili: <2 mg/dL   Blood: x / Protein: 100 mg/dL / Nitrite: Negative   Leuk Esterase: Negative / RBC: 5 /HPF / WBC 1 /HPF   Sq Epi: x / Non Sq Epi: 2 /HPF / Bacteria: Negative PGY I NOTE    LENGTH OF HOSPITAL STAY:      CHIEF COMPLAINT:Patient is a 24y old  Female who presents with a chief complaint of HTN crisis (11 Aug 2019 06:59)    HPI:HPI:  23 yo F w/ no significant PMH presents with Rt facial numbness beginning 2 days ago. Denies other focal numbness or weakness. Admits to hx of impacted molars bilaterally. Had hx of workup for lupus which was negative. Denies fever, chills, CP, SOB, LE pain or swelling, recent travel or exogenous hormone use. Denies malar rash.    Family hx positive for Lupus, Sjogren's disease (both, mother), and Hashimoto's (aunt)    In the ED, pt was found to be hypertensive at 245/128. Neurology consulted and recommended CTH and MRI head. CTH negative for acute pathology. MRI pending. Nicardipine gtt started and BP decreased and sxs resolved. ICU consulted for hypertensive emergency requiring nicardipine gtt.     Vital Signs Last 24 Hrs  T(C): 36.3 (11 Aug 2019 03:29), Max: 36.3 (11 Aug 2019 03:29)  T(F): 97.4 (11 Aug 2019 03:29), Max: 97.4 (11 Aug 2019 03:29)  HR: 124 (11 Aug 2019 06:30) (112 - 143)  BP: 174/83 (11 Aug 2019 06:30) (174/83 - 245/128)  BP(mean): --  RR: 16 (11 Aug 2019 06:30) (16 - 22)  SpO2: 98% (11 Aug 2019 06:30) (98% - 100%) (11 Aug 2019 06:50)    OVERNIGHT EVENTS/INTERVAL UPDATES:  patient is sitting comfortably in bed, no acute events overnight, pt stable on nicardipine drip, planned for MR head and neck  today  denied any chest pain, sob, abdominal pain, nausea, vomiting, weakness or headache, negative on ros  PMH & PSH  PAST MEDICAL & SURGICAL HISTORY:    SOCIAL HISTORY: Negative    ALLERGIES: Sulfacetamide Sodium (Unknown)    HOME MEDICATIONS  Home Medications:    PHYSICAL EXAM:  T(F): 97.8 (19 @ 12:15), Max: 98.3 (19 @ 07:00)  HR: 108 (19 @ 13:00)  BP: 169/93 (19 @ 13:00)  RR: 24 (19 @ 13:00)  SpO2: 99% (19 @ 13:00)  CAPILLARY BLOOD GLUCOSE          19 @ 07:01  -  19 @ 14:13  --------------------------------------------------------  IN:    niCARdipine Infusion: 25 mL  Total IN: 25 mL    OUT:  Total OUT: 0 mL    Total NET: 25 mL            General: NAD  HEENT: NCAT  CV: RRR  RESP: CTAB  Abdominal: Soft, NTTP  Extremity: no c/c/e  Neuro: A&O x4, no focal neurologic deficit    MEDICATIONS  STANDING MEDICATIONS  heparin  Injectable 5000 Unit(s) SubCutaneous every 12 hours  labetalol 100 milliGRAM(s) Oral every 8 hours  niCARdipine Infusion 5 mG/Hr IV Continuous <Continuous>    PRN MEDICATIONS  ondansetron    Tablet 4 milliGRAM(s) Oral every 8 hours PRN    LABS:                        14.2   12.77 )-----------( 356      ( 11 Aug 2019 11:20 )             43.9              08    140  |  102  |  12  ----------------------------<  101<H>  4.1   |  24  |  0.7    Ca    10.0      11 Aug 2019 11:20  Mg     2.0         TPro  7.8  /  Alb  4.5  /  TBili  0.3  /  DBili  x   /  AST  23  /  ALT  28  /  AlkPhos  129<H>      LIVER FUNCTIONS - ( 11 Aug 2019 11:20 )  Alb: 4.5 g/dL / Pro: 7.8 g/dL / ALK PHOS: 129 U/L / ALT: 28 U/L / AST: 23 U/L / GGT: x                        CARDIAC MARKERS ( 11 Aug 2019 11:20 )  x     / 0.02 ng/mL / x     / x     / x                        Urinalysis Basic - ( 11 Aug 2019 09:12 )    Color: Colorless / Appearance: Clear / S.010 / pH: x  Gluc: x / Ketone: Trace  / Bili: Negative / Urobili: <2 mg/dL   Blood: x / Protein: 100 mg/dL / Nitrite: Negative   Leuk Esterase: Negative / RBC: 5 /HPF / WBC 1 /HPF   Sq Epi: x / Non Sq Epi: 2 /HPF / Bacteria: Negative

## 2019-08-11 NOTE — ED PROVIDER NOTE - CLINICAL SUMMARY MEDICAL DECISION MAKING FREE TEXT BOX
23 y/o F with no significant PMH who presents with R facial numbness beginning 2 days ago. Denies other focal numbness or weakness. Does admit to hx of impacted molars bilaterally. Denies fever, chills, n/v/d, CP, SOB, LE pain or swelling. Pt states her facial numbness started Friday intermittently when it returned yesterday she went to hospital, pt denies headaches, blurry vision, numbness /tingling or weakness in the extremities.     On exam, VS reviewed and patient has new and malignantly high BP. Patient is tachycardic to the 120-130's. Neuro exam is non-focal and normal.  Patient non-toxic appearing. LArge bore iV placed and labs sent.  Blood pressure rechecked multiple times and remained high. Pheochromocytoma and PRES both considered and Neuro called case discussed with Dr. Evans.  Patient upgraded to Critical Care ED and Nicardipine drip started. Patient had immediate improvement in 's/80's.  Initial goal was 25% reduction in BP.  Dr. Evans requests immediate MRI to evaluate for PRES>  Neuro evaluated patient art bedside. Her BP improved immediately with drip and is reliable, I do not feel patient requires jhon arterial line at this time. ICU consulted and recommend tele admission.  Will get immediate MRI.  Inpatient team to continue care and work up[. Patient and family spoken to about results and plan of care and urine metanephrines sent.

## 2019-08-11 NOTE — H&P ADULT - NSICDXFAMILYHX_GEN_ALL_CORE_FT
FAMILY HISTORY:  Family history of Hashimoto thyroiditis  Family history of systemic lupus erythematosus

## 2019-08-11 NOTE — PROGRESS NOTE ADULT - ASSESSMENT
hypertensive emergency, r/o PRES  - avoid sedation  -ct no evidence of acute intracranial pathology  - f/u MRI. and mr neck  -  FU with neuro   -neurochecks q1  -f/u urine tox, lázaro, pheo labs  -target blood pressure 180-190    HEENT:  - oral care    PULMONARY:  - keep head between 30 - 45 degrees.  CXR     CARDIOVASCULAR:  - trend CE x 3   - c/w nicardipine gtt  - begin labetolol PO 100mg q8h   - ECHO    GI:   - GI prophylaxis  - Feeding     RENAL:  - f/u UA and UO   - f/u Urine metanephrine   - f/u urine tox   - Renal eval    INFECTIOUS DISEASE:  - f/u pancultures     HEMATOLOGICAL:    - DVT prophylaxis.    ENDOCRINE:    - Follow up FS  - Insulin protocol if needed.    MUSCULOSKELETAL:  - activity increase as tolerated once BP controlled     MICU for now hypertensive emergency, r/o PRES, urine tox, pheo, autoimmune etiology ...  - avoid sedation  -ct no evidence of acute intracranial pathology  - f/u MRI. and mr neck  -  FU with neuro   -neurochecks q1  -target blood pressure 180 for now, f/u neurology for bp target   - f/u UA and UO   - f/u Urine metanephrine   - f/u urine tox   - Renal eval    HEENT:  - oral care    PULMONARY:  - keep head between 30 - 45 degrees.  CXR     CARDIOVASCULAR:  - trend CE x 3   - c/w nicardipine gtt  - started labetolol PO 100mg q8h   - ECHO f/u    GI:   - GI prophylaxis  - Feeding CARDENAS diet    INFECTIOUS DISEASE:  - f/u pancultures     HEMATOLOGICAL:    - DVT prophylaxis.    ENDOCRINE:    - Follow up FS  - Insulin protocol if needed.    MUSCULOSKELETAL:  - activity increase as tolerated once BP controlled     MICU for now

## 2019-08-11 NOTE — ED ADULT NURSE NOTE - CHIEF COMPLAINT QUOTE
Patient states she feels a tingling on the right side of her face and nose since friday afternoon. Patient states she has right upper wisdom teeth that are impacted. Patient states she is extremely nervous. No neurological deficits noted.

## 2019-08-12 LAB
ALBUMIN SERPL ELPH-MCNC: 3.8 G/DL — SIGNIFICANT CHANGE UP (ref 3.5–5.2)
ALP SERPL-CCNC: 103 U/L — SIGNIFICANT CHANGE UP (ref 30–115)
ALT FLD-CCNC: 20 U/L — SIGNIFICANT CHANGE UP (ref 0–41)
AMPHET UR-MCNC: NEGATIVE — SIGNIFICANT CHANGE UP
AMPHET UR-MCNC: NEGATIVE — SIGNIFICANT CHANGE UP
ANION GAP SERPL CALC-SCNC: 14 MMOL/L — SIGNIFICANT CHANGE UP (ref 7–14)
AST SERPL-CCNC: 16 U/L — SIGNIFICANT CHANGE UP (ref 0–41)
BARBITURATES UR SCN-MCNC: NEGATIVE — SIGNIFICANT CHANGE UP
BARBITURATES UR SCN-MCNC: NEGATIVE — SIGNIFICANT CHANGE UP
BASOPHILS # BLD AUTO: 0.05 K/UL — SIGNIFICANT CHANGE UP (ref 0–0.2)
BASOPHILS NFR BLD AUTO: 0.4 % — SIGNIFICANT CHANGE UP (ref 0–1)
BENZODIAZ UR-MCNC: NEGATIVE — SIGNIFICANT CHANGE UP
BENZODIAZ UR-MCNC: NEGATIVE — SIGNIFICANT CHANGE UP
BILIRUB SERPL-MCNC: 0.4 MG/DL — SIGNIFICANT CHANGE UP (ref 0.2–1.2)
BUN SERPL-MCNC: 14 MG/DL — SIGNIFICANT CHANGE UP (ref 10–20)
CALCIUM SERPL-MCNC: 9.3 MG/DL — SIGNIFICANT CHANGE UP (ref 8.5–10.1)
CHLORIDE SERPL-SCNC: 101 MMOL/L — SIGNIFICANT CHANGE UP (ref 98–110)
CO2 SERPL-SCNC: 24 MMOL/L — SIGNIFICANT CHANGE UP (ref 17–32)
COCAINE METAB.OTHER UR-MCNC: NEGATIVE — SIGNIFICANT CHANGE UP
COCAINE METAB.OTHER UR-MCNC: NEGATIVE — SIGNIFICANT CHANGE UP
CREAT SERPL-MCNC: 0.8 MG/DL — SIGNIFICANT CHANGE UP (ref 0.7–1.5)
CRP SERPL-MCNC: 0.3 MG/DL — SIGNIFICANT CHANGE UP (ref 0–0.4)
EOSINOPHIL # BLD AUTO: 0.09 K/UL — SIGNIFICANT CHANGE UP (ref 0–0.7)
EOSINOPHIL NFR BLD AUTO: 0.8 % — SIGNIFICANT CHANGE UP (ref 0–8)
GLUCOSE SERPL-MCNC: 93 MG/DL — SIGNIFICANT CHANGE UP (ref 70–99)
HCT VFR BLD CALC: 40.3 % — SIGNIFICANT CHANGE UP (ref 37–47)
HGB BLD-MCNC: 12.9 G/DL — SIGNIFICANT CHANGE UP (ref 12–16)
IMM GRANULOCYTES NFR BLD AUTO: 0.2 % — SIGNIFICANT CHANGE UP (ref 0.1–0.3)
LYMPHOCYTES # BLD AUTO: 3.92 K/UL — HIGH (ref 1.2–3.4)
LYMPHOCYTES # BLD AUTO: 32.8 % — SIGNIFICANT CHANGE UP (ref 20.5–51.1)
MAGNESIUM SERPL-MCNC: 2.1 MG/DL — SIGNIFICANT CHANGE UP (ref 1.8–2.4)
MCHC RBC-ENTMCNC: 28.5 PG — SIGNIFICANT CHANGE UP (ref 27–31)
MCHC RBC-ENTMCNC: 32 G/DL — SIGNIFICANT CHANGE UP (ref 32–37)
MCV RBC AUTO: 89 FL — SIGNIFICANT CHANGE UP (ref 81–99)
METHADONE UR-MCNC: NEGATIVE — SIGNIFICANT CHANGE UP
METHADONE UR-MCNC: NEGATIVE — SIGNIFICANT CHANGE UP
MONOCYTES # BLD AUTO: 0.94 K/UL — HIGH (ref 0.1–0.6)
MONOCYTES NFR BLD AUTO: 7.9 % — SIGNIFICANT CHANGE UP (ref 1.7–9.3)
NEUTROPHILS # BLD AUTO: 6.94 K/UL — HIGH (ref 1.4–6.5)
NEUTROPHILS NFR BLD AUTO: 57.9 % — SIGNIFICANT CHANGE UP (ref 42.2–75.2)
NRBC # BLD: 0 /100 WBCS — SIGNIFICANT CHANGE UP (ref 0–0)
OPIATES UR-MCNC: NEGATIVE — SIGNIFICANT CHANGE UP
OPIATES UR-MCNC: NEGATIVE — SIGNIFICANT CHANGE UP
PCP SPEC-MCNC: SIGNIFICANT CHANGE UP
PCP SPEC-MCNC: SIGNIFICANT CHANGE UP
PHOSPHATE SERPL-MCNC: 4.8 MG/DL — SIGNIFICANT CHANGE UP (ref 2.1–4.9)
PLATELET # BLD AUTO: 306 K/UL — SIGNIFICANT CHANGE UP (ref 130–400)
POTASSIUM SERPL-MCNC: 4.2 MMOL/L — SIGNIFICANT CHANGE UP (ref 3.5–5)
POTASSIUM SERPL-SCNC: 4.2 MMOL/L — SIGNIFICANT CHANGE UP (ref 3.5–5)
PROPOXYPHENE QUALITATIVE URINE RESULT: NEGATIVE — SIGNIFICANT CHANGE UP
PROPOXYPHENE QUALITATIVE URINE RESULT: NEGATIVE — SIGNIFICANT CHANGE UP
PROT SERPL-MCNC: 6.6 G/DL — SIGNIFICANT CHANGE UP (ref 6–8)
RBC # BLD: 4.53 M/UL — SIGNIFICANT CHANGE UP (ref 4.2–5.4)
RBC # FLD: 12.8 % — SIGNIFICANT CHANGE UP (ref 11.5–14.5)
RHEUMATOID FACT SERPL-ACNC: <10 IU/ML — SIGNIFICANT CHANGE UP (ref 0–13)
SODIUM SERPL-SCNC: 139 MMOL/L — SIGNIFICANT CHANGE UP (ref 135–146)
WBC # BLD: 11.96 K/UL — HIGH (ref 4.8–10.8)
WBC # FLD AUTO: 11.96 K/UL — HIGH (ref 4.8–10.8)

## 2019-08-12 PROCEDURE — 99232 SBSQ HOSP IP/OBS MODERATE 35: CPT

## 2019-08-12 PROCEDURE — 93979 VASCULAR STUDY: CPT | Mod: 26

## 2019-08-12 PROCEDURE — 76770 US EXAM ABDO BACK WALL COMP: CPT | Mod: 26,59

## 2019-08-12 PROCEDURE — 71045 X-RAY EXAM CHEST 1 VIEW: CPT | Mod: 26

## 2019-08-12 RX ORDER — PANTOPRAZOLE SODIUM 20 MG/1
40 TABLET, DELAYED RELEASE ORAL
Refills: 0 | Status: DISCONTINUED | OUTPATIENT
Start: 2019-08-12 | End: 2019-08-13

## 2019-08-12 RX ORDER — AMLODIPINE BESYLATE 2.5 MG/1
5 TABLET ORAL DAILY
Refills: 0 | Status: DISCONTINUED | OUTPATIENT
Start: 2019-08-12 | End: 2019-08-13

## 2019-08-12 RX ORDER — CHLORHEXIDINE GLUCONATE 213 G/1000ML
1 SOLUTION TOPICAL
Refills: 0 | Status: DISCONTINUED | OUTPATIENT
Start: 2019-08-12 | End: 2019-08-13

## 2019-08-12 RX ADMIN — HEPARIN SODIUM 5000 UNIT(S): 5000 INJECTION INTRAVENOUS; SUBCUTANEOUS at 17:29

## 2019-08-12 RX ADMIN — PANTOPRAZOLE SODIUM 40 MILLIGRAM(S): 20 TABLET, DELAYED RELEASE ORAL at 06:50

## 2019-08-12 RX ADMIN — Medication 100 MILLIGRAM(S): at 14:27

## 2019-08-12 RX ADMIN — HEPARIN SODIUM 5000 UNIT(S): 5000 INJECTION INTRAVENOUS; SUBCUTANEOUS at 05:26

## 2019-08-12 RX ADMIN — AMLODIPINE BESYLATE 5 MILLIGRAM(S): 2.5 TABLET ORAL at 17:28

## 2019-08-12 RX ADMIN — Medication 100 MILLIGRAM(S): at 21:29

## 2019-08-12 RX ADMIN — Medication 100 MILLIGRAM(S): at 05:25

## 2019-08-12 NOTE — CONSULT NOTE ADULT - SUBJECTIVE AND OBJECTIVE BOX
HPI:  25 yo F w/ no significant PMH presents with Rt facial numbness beginning 2 days ago. Denies other focal numbness or weakness. Admits to hx of impacted molars bilaterally. Had hx of workup for lupus which was negative. Denies fever, chills, CP, SOB, LE pain or swelling, recent travel or exogenous hormone use. Denies malar rash.    Family hx positive for Lupus, Sjogren's disease (both, mother), and Hashimoto's (aunt)    In the ED, pt was found to be hypertensive at 245/128. Neurology consulted and recommended CTH and MRI head. CTH negative for acute pathology. MRI pending. Nicardipine gtt started and BP decreased and sxs resolved. ICU consulted for hypertensive emergency requiring nicardipine gtt.         REVIEW OF SYSTEMS:  CONSTITUTIONAL: No fever, weight loss, or fatigue  EYES: No eye pain, visual disturbances, or discharge  ENMT:  No difficulty hearing, tinnitus, vertigo; No sinus or throat pain  NECK: No pain or stiffness  RESPIRATORY: No cough, wheezing, chills or hemoptysis; No shortness of breath  CARDIOVASCULAR: No chest pain, palpitations, dizziness, or leg swelling  GASTROINTESTINAL: No abdominal or epigastric pain. No nausea, vomiting, or hematemesis; No diarrhea or constipation. No melena or hematochezia.  GENITOURINARY: No dysuria, frequency, hematuria, or incontinence  NEUROLOGICAL: No headaches, memory loss, loss of strength, numbness, or tremors  LYMPH NODES: No enlarged glands      T(C): 36.3 (19 @ 08:00), Max: 36.8 (19 @ 00:00)  HR: 84 (19 @ 11:00) (74 - 108)  BP: 124/77 (19 @ 11:00) (124/77 - 181/97)  RR: 19 (19 @ 11:00) (12 - 27)  SpO2: 96% (19 @ 11:00) (96% - 100%)  Wt(kg): --Vital Signs Last 24 Hrs  T(C): 36.3 (12 Aug 2019 08:00), Max: 36.8 (12 Aug 2019 00:00)  T(F): 97.4 (12 Aug 2019 08:00), Max: 98.2 (12 Aug 2019 00:00)  HR: 84 (12 Aug 2019 11:00) (74 - 108)  BP: 124/77 (12 Aug 2019 11:00) (124/77 - 181/97)  BP(mean): 94 (12 Aug 2019 11:00) (91 - 138)  RR: 19 (12 Aug 2019 11:00) (12 - 27)  SpO2: 96% (12 Aug 2019 11:00) (96% - 100%)    PHYSICAL EXAM:  GENERAL: NAD, well-groomed, well-developed  HEAD:  Atraumatic, Normocephalic  NECK: Supple, No JVD, Normal thyroid  NERVOUS SYSTEM:  Alert & Oriented X3, Good concentration; Motor Strength 5/5 B/L upper and lower extremities  CHEST/LUNG: Clear to percussion bilaterally; No rales, rhonchi, wheezing, or rubs  HEART: Regular rate and rhythm; No murmurs, rubs, or gallops  ABDOMEN: Soft, Nontender, Nondistended; Bowel sounds present  EXTREMITIES:  2+ Peripheral Pulses, No clubbing, cyanosis, or edema  LYMPH: No lymphadenopathy noted      LABS:                          12.9   11.96 )-----------( 306      ( 12 Aug 2019 04:26 )             40.3     08-12    139  |  101  |  14  ----------------------------<  93  4.2   |  24  |  0.8    Ca    9.3      12 Aug 2019 04:26  Phos  4.8     08-12  Mg     2.1     08-12    TPro  6.6  /  Alb  3.8  /  TBili  0.4  /  DBili  x   /  AST  16  /  ALT  20  /  AlkPhos  103  08-12    Urinalysis Basic - ( 11 Aug 2019 09:12 )    Color: Colorless / Appearance: Clear / S.010 / pH: x  Gluc: x / Ketone: Trace  / Bili: Negative / Urobili: <2 mg/dL   Blood: x / Protein: 100 mg/dL / Nitrite: Negative   Leuk Esterase: Negative / RBC: 5 /HPF / WBC 1 /HPF   Sq Epi: x / Non Sq Epi: 2 /HPF / Bacteria: Negative Nephrology consult:    23 yo F w/ no significant PMH presents with Rt facial numbness beginning 2 days ago. Denies other focal numbness or weakness. Admits to hx of impacted molars bilaterally. Had hx of workup for lupus which was negative. Denies fever, chills, CP, SOB, LE pain or swelling no hematuria , recent travel or exogenous hormone use. Denies malar rash.    Family hx positive for Lupus, Sjogren's disease (both, mother), and Hashimoto's (aunt)    In the ED, pt was found to be hypertensive at 245/128. Neurology consulted and recommended CTH and MRI head. CTH negative for acute pathology. MRI pending. Nicardipine gtt started and BP decreased and sxs resolved. ICU consulted for hypertensive urgency requiring nicardipine gtt.         REVIEW OF SYSTEMS:  CONSTITUTIONAL: No fever, weight loss, or fatigue  EYES: No eye pain, visual disturbances, or discharge  ENMT:  No difficulty hearing, tinnitus, vertigo; No sinus or throat pain  NECK: No pain or stiffness  RESPIRATORY: No cough, wheezing, chills or hemoptysis; No shortness of breath  CARDIOVASCULAR: No chest pain, palpitations, dizziness, or leg swelling  GASTROINTESTINAL: No abdominal or epigastric pain. No nausea, vomiting, or hematemesis; No diarrhea or constipation. No melena or hematochezia.  GENITOURINARY: No dysuria, frequency, hematuria, or incontinence  NEUROLOGICAL: No headaches, memory loss, loss of strength, numbness, or tremors  LYMPH NODES: No enlarged glands      T(C): 36.3 (19 @ 08:00), Max: 36.8 (19 @ 00:00)  HR: 84 (19 @ 11:00) (74 - 108)  BP: 124/77 (19 @ 11:00) (124/77 - 181/97)  RR: 19 (19 @ 11:00) (12 - 27)  SpO2: 96% (19 @ 11:00) (96% - 100%)  Wt(kg): --Vital Signs Last 24 Hrs  T(C): 36.3 (12 Aug 2019 08:00), Max: 36.8 (12 Aug 2019 00:00)  T(F): 97.4 (12 Aug 2019 08:00), Max: 98.2 (12 Aug 2019 00:00)  HR: 84 (12 Aug 2019 11:00) (74 - 108)  BP: 124/77 (12 Aug 2019 11:00) (124/77 - 181/97)  BP(mean): 94 (12 Aug 2019 11:00) (91 - 138)  RR: 19 (12 Aug 2019 11:00) (12 - 27)  SpO2: 96% (12 Aug 2019 11:00) (96% - 100%)    PHYSICAL EXAM:  GENERAL: NAD, well-groomed, well-developed  HEAD:  Atraumatic, Normocephalic  NECK: Supple, No JVD, Normal thyroid  NERVOUS SYSTEM:  Alert & Oriented X3, Good concentration; Motor Strength 5/5 B/L upper and lower extremities  CHEST/LUNG: Clear to percussion bilaterally; No rales, rhonchi, wheezing, or rubs  HEART: Regular rate and rhythm; No murmurs, rubs, or gallops  ABDOMEN: Soft, Nontender, Nondistended; Bowel sounds present  EXTREMITIES:  2+ Peripheral Pulses, No clubbing, cyanosis, or edema  LYMPH: No lymphadenopathy noted      LABS:                          12.9   11.96 )-----------( 306      ( 12 Aug 2019 04:26 )             40.3     08-12    139  |  101  |  14  ----------------------------<  93  4.2   |  24  |  0.8    Ca    9.3      12 Aug 2019 04:26  Phos  4.8     08-12  Mg     2.1     08-12    TPro  6.6  /  Alb  3.8  /  TBili  0.4  /  DBili  x   /  AST  16  /  ALT  20  /  AlkPhos  103  08-12    Urinalysis Basic - ( 11 Aug 2019 09:12 )    Color: Colorless / Appearance: Clear / S.010 / pH: x  Gluc: x / Ketone: Trace  / Bili: Negative / Urobili: <2 mg/dL   Blood: x / Protein: 100 mg/dL / Nitrite: Negative   Leuk Esterase: Negative / RBC: 5 /HPF / WBC 1 /HPF   Sq Epi: x / Non Sq Epi: 2 /HPF / Bacteria: Negative

## 2019-08-12 NOTE — PROGRESS NOTE ADULT - SUBJECTIVE AND OBJECTIVE BOX
RHEA MORENO 24y Female  MRN#: [id number]     SUBJECTIVE  Patient is a 24y old Female who presents with a chief complaint of Rt facial numbness (12 Aug 2019 10:09)  Today is hospital day 1d, and this morning she is lying in bed without distress.   No acute overnight events. R sided numbness resolved  CONSTITUTIONAL - No fever, No diaphoresis,   EYES - No eye pain, No blurred vision  ENT - No change in hearing, No sore throat, No neck pain, No rhinorrhea, No ear pain  RESPIRATORY - No shortness of breath, No cough  CARDIAC -No chest pain, No palpitations  GI - No abdominal pain, No nausea, No vomiting, No diarrhea, No constipation, No bright red blood per rectum or melena. No flank pain  - No dysuria, frequency, hematuria.   NEUROLOGIC - No numbness, No focal weakness, No headache, No dizziness        OBJECTIVE  PAST MEDICAL & SURGICAL HISTORY    ALLERGIES:  Sulfacetamide Sodium (Unknown)    MEDICATIONS:  STANDING MEDICATIONS  chlorhexidine 2% Cloths 1 Application(s) Topical <User Schedule>  heparin  Injectable 5000 Unit(s) SubCutaneous every 12 hours  labetalol 100 milliGRAM(s) Oral every 8 hours  pantoprazole    Tablet 40 milliGRAM(s) Oral before breakfast    PRN MEDICATIONS  ondansetron    Tablet 4 milliGRAM(s) Oral every 8 hours PRN    HOME MEDICATIONS  Home Medications:      VITAL SIGNS: Last 24 Hours  T(C): 36.3 (12 Aug 2019 08:00), Max: 36.8 (12 Aug 2019 00:00)  T(F): 97.4 (12 Aug 2019 08:00), Max: 98.2 (12 Aug 2019 00:00)  HR: 84 (12 Aug 2019 11:00) (74 - 108)  BP: 124/77 (12 Aug 2019 11:00) (124/77 - 181/97)  BP(mean): 94 (12 Aug 2019 11:00) (91 - 138)  RR: 19 (12 Aug 2019 11:00) (12 - 27)  SpO2: 96% (12 Aug 2019 11:00) (96% - 100%)    19 @ 07:01  -  19 @ 07:00  --------------------------------------------------------  IN: 807.5 mL / OUT: 375 mL / NET: 432.5 mL        LABS:                        12.9   11.96 )-----------( 306      ( 12 Aug 2019 04:26 )             40.3     -12    139  |  101  |  14  ----------------------------<  93  4.2   |  24  |  0.8    Ca    9.3      12 Aug 2019 04:26  Phos  4.8       Mg     2.1         TPro  6.6  /  Alb  3.8  /  TBili  0.4  /  DBili  x   /  AST  16  /  ALT  20  /  AlkPhos  103  12    LIVER FUNCTIONS - ( 12 Aug 2019 04:26 )  Alb: 3.8 g/dL / Pro: 6.6 g/dL / ALK PHOS: 103 U/L / ALT: 20 U/L / AST: 16 U/L / GGT: x             Urinalysis Basic - ( 11 Aug 2019 09:12 )    Color: Colorless / Appearance: Clear / S.010 / pH: x  Gluc: x / Ketone: Trace  / Bili: Negative / Urobili: <2 mg/dL   Blood: x / Protein: 100 mg/dL / Nitrite: Negative   Leuk Esterase: Negative / RBC: 5 /HPF / WBC 1 /HPF   Sq Epi: x / Non Sq Epi: 2 /HPF / Bacteria: Negative        Troponin T, Serum: 0.02 ng/mL <H> (19 @ 16:42)      CARDIAC MARKERS ( 11 Aug 2019 16:42 )  x     / 0.02 ng/mL / x     / x     / x      CARDIAC MARKERS ( 11 Aug 2019 11:20 )  x     / 0.02 ng/mL / x     / x     / x          CAPILLARY BLOOD GLUCOSE          RADIOLOGY:    PHYSICAL EXAM:  PHYSICAL EXAM:  GENERAL: NAD, AAO x 4, 24y F  HEAD:  Atraumatic, Normocephalic  EYES: EOMI, conjunctiva and sclera white  NECK: Supple, No JVD  CHEST/LUNG: Clear to auscultation bilaterally; No wheeze; No crackles; No accessory muscles used  HEART: Regular rate and rhythm; No murmurs;   ABDOMEN: Soft, Nontender, Nondistended; Bowel sounds present; No guarding  EXTREMITIES:  2+ Peripheral Pulses, No cyanosis or edema  NEUROLOGY: non-focal    ADMISSION SUMMARY  Patient is a 24y old Female who presents with a chief complaint of Rt facial numbness (12 Aug 2019 10:09)

## 2019-08-12 NOTE — PROGRESS NOTE ADULT - ASSESSMENT
imp;        PLAN: imp; 19 y/o F in HTN crisis r/o PRES        PLAN: await  MRI read          F/u HTN work up         Neuro checks  q 1H        Nicardipine PRN for -180 imp; 19 y/o F in HTN crisis r/o PRES        PLAN: await  MRI read          F/u HTN work up         Neuro checks  q 1H        Nicardipine PRN for -180        Increase Labetalol as needed

## 2019-08-12 NOTE — PROGRESS NOTE ADULT - SUBJECTIVE AND OBJECTIVE BOX
Patient is a 24y old  Female who presents with a chief complaint of Rt facial numbness (12 Aug 2019 03:00)        Over Night Events: no issues, off drips, on PO BP meds    PHYSICAL EXAM    ICU Vital Signs Last 24 Hrs  T(C): 36.3 (12 Aug 2019 08:00), Max: 36.8 (12 Aug 2019 00:00)  T(F): 97.4 (12 Aug 2019 08:00), Max: 98.2 (12 Aug 2019 00:00)  HR: 82 (12 Aug 2019 10:00) (74 - 108)  BP: 137/77 (12 Aug 2019 10:00) (131/67 - 181/97)  BP(mean): 96 (12 Aug 2019 10:00) (91 - 138)  RR: 14 (12 Aug 2019 10:00) (12 - 27)  SpO2: 98% (12 Aug 2019 10:00) (97% - 100%)      General: Not in distress  HEENT: LISETTE             Lymphatic system: No lymphadenopathy  Lungs: Bilateral BS CTA  Cardiovascular: Regular   Gastrointestinal: Soft, Positive BS  Extremities: Moves extremities.    Skin: Warm, intact  Neurological: nonfocal      19 @ 07:01  -  19 @ 07:00  --------------------------------------------------------  IN:    niCARdipine Infusion: 87.5 mL    Oral Fluid: 720 mL  Total IN: 807.5 mL    OUT:    Voided: 375 mL  Total OUT: 375 mL    Total NET: 432.5 mL        LABS:                            12.9   11.96 )-----------( 306      ( 12 Aug 2019 04:26 )             40.3                                               08-12    139  |  101  |  14  ----------------------------<  93  4.2   |  24  |  0.8    Ca    9.3      12 Aug 2019 04:26  Phos  4.8     08-12  Mg     2.1     08-12    TPro  6.6  /  Alb  3.8  /  TBili  0.4  /  DBili  x   /  AST  16  /  ALT  20  /  AlkPhos  103  08-12                                             Urinalysis Basic - ( 11 Aug 2019 09:12 )    Color: Colorless / Appearance: Clear / S.010 / pH: x  Gluc: x / Ketone: Trace  / Bili: Negative / Urobili: <2 mg/dL   Blood: x / Protein: 100 mg/dL / Nitrite: Negative   Leuk Esterase: Negative / RBC: 5 /HPF / WBC 1 /HPF   Sq Epi: x / Non Sq Epi: 2 /HPF / Bacteria: Negative        CARDIAC MARKERS ( 11 Aug 2019 16:42 )  x     / 0.02 ng/mL / x     / x     / x      CARDIAC MARKERS ( 11 Aug 2019 11:20 )  x     / 0.02 ng/mL / x     / x     / x                                                LIVER FUNCTIONS - ( 12 Aug 2019 04:26 )  Alb: 3.8 g/dL / Pro: 6.6 g/dL / ALK PHOS: 103 U/L / ALT: 20 U/L / AST: 16 U/L / GGT: x                                                CXR: no infiltrates    < from: Transthoracic Echocardiogram (19 @ 10:34) >  Summary:   1. Left ventricular ejection fraction, by visual estimation, is 55 to   60%.   2. Spectral Doppler shows impaired relaxation pattern of left   ventricular myocardial filling (Grade I diastolic dysfunction).   3. Mild mitral valve regurgitation.    < end of copied text >      MEDICATIONS  (STANDING):  chlorhexidine 2% Cloths 1 Application(s) Topical <User Schedule>  heparin  Injectable 5000 Unit(s) SubCutaneous every 12 hours  labetalol 100 milliGRAM(s) Oral every 8 hours  niCARdipine Infusion 5 mG/Hr (25 mL/Hr) IV Continuous <Continuous>  pantoprazole    Tablet 40 milliGRAM(s) Oral before breakfast    MEDICATIONS  (PRN):  ondansetron    Tablet 4 milliGRAM(s) Oral every 8 hours PRN Nausea and/or Vomiting

## 2019-08-12 NOTE — CONSULT NOTE ADULT - ATTENDING COMMENTS
PATIENT SEEN AND EXAMINED  AGREE WITH PA NOTE ABOVE EXCEPT AS NOTED  PATIENT WITH SEVERAL DAYS OF NUMBNESS AROUND HER LIP AND PROFOUNDLY ELEVATED BP ON ADM OF UNCLEAR ETIOLOGY    LIKELY HTN CRISIS VS CVA AND LESS LIKELY ATYPICAL PRES  ADMIT TO ICU  WOULD MRI BRAIN NC  CONT BP MANAGEMENT AVOIDING LARGE FLUCTUATIONS   med eval of htn  patient is stable with normal exam now
I was Physically Present for the key portions of the evaluation   I agree with the above History  , Physical examination Assessment and plan   I have Reviewed , Modified or appended where appropriate.  Please check A and P as above   1- HTN urgency doubt secondary causes  ok with labetalol now  check renal bladder sono renal artery dupplex check uprotein creat ratio  will follow

## 2019-08-12 NOTE — PROGRESS NOTE ADULT - SUBJECTIVE AND OBJECTIVE BOX
Neurology Follow up note      Name  RHEA MORENO    HPI:  25 yo F w/ no significant PMH presents with Rt facial numbness beginning 2 days ago. Denies other focal numbness or weakness. Admits to hx of impacted molars bilaterally. Had hx of workup for lupus which was negative. Denies fever, chills, CP, SOB, LE pain or swelling, recent travel or exogenous hormone use. Denies malar rash.    Family hx positive for Lupus, Sjogren's disease (both, mother), and Hashimoto's (aunt)    In the ED, pt was found to be hypertensive at 245/128. Neurology consulted and recommended CTH and MRI head. CTH negative for acute pathology. MRI pending. Nicardipine gtt started and BP decreased and sxs resolved. ICU consulted for hypertensive emergency requiring nicardipine gtt.     Vital Signs Last 24 Hrs  T(C): 36.3 (11 Aug 2019 03:29), Max: 36.3 (11 Aug 2019 03:29)  T(F): 97.4 (11 Aug 2019 03:29), Max: 97.4 (11 Aug 2019 03:29)  HR: 124 (11 Aug 2019 06:30) (112 - 143)  BP: 174/83 (11 Aug 2019 06:30) (174/83 - 245/128)  BP(mean): --  RR: 16 (11 Aug 2019 06:30) (16 - 22)  SpO2: 98% (11 Aug 2019 06:30) (98% - 100%) (11 Aug 2019 06:50)      Interval History -          Vital Signs Last 24 Hrs  T(C): 36.8 (12 Aug 2019 00:00), Max: 36.8 (11 Aug 2019 07:00)  T(F): 98.2 (12 Aug 2019 00:00), Max: 98.3 (11 Aug 2019 07:00)  HR: 76 (12 Aug 2019 02:00) (76 - 143)  BP: 151/86 (12 Aug 2019 02:00) (131/67 - 245/128)  BP(mean): 106 (12 Aug 2019 02:00) (91 - 138)  RR: 15 (12 Aug 2019 02:00) (12 - 27)  SpO2: 98% (12 Aug 2019 02:00) (97% - 100%)          Neurological Exam:   Mental status: Awake, alert and oriented x3.  Recent and remote memory intact.  Naming, repetition and comprehension intact.  Attention/concentration intact.  No dysarthria, no aphasia.  Fund of knowledge appropriate.    Cranial nerves: Fundoscopic exam demonstrated no abnormalities, pupils equally round and reactive to light, visual fields full, no nystagmus, extraocular muscles intact, V1 through V3 intact bilaterally and symmetric, face symmetric, hearing intact to finger rub, palate elevation symmetric, tongue was midline, sternocleidomastoid/shoulder shrug strength bilaterally 5/5.    Motor:  Normal bulk and tone, strength 5/5 in bilateral upper and lower extremities.   strength 5/5.  Rapid alternating movements intact and symmetric.   Sensation: Intact to light touch, proprioception, and pinprick.  No neglect.   Coordination: No dysmetria on finger-to-nose and heel-to-shin.  No clumsiness.  Reflexes: 2+ in upper and lower extremities, downgoing toes bilaterally  Gait: Narrow and steady. No ataxia.  Romberg negative    Medications  chlorhexidine 2% Cloths 1 Application(s) Topical <User Schedule>  heparin  Injectable 5000 Unit(s) SubCutaneous every 12 hours  labetalol 100 milliGRAM(s) Oral every 8 hours  niCARdipine Infusion 5 mG/Hr IV Continuous <Continuous>  ondansetron    Tablet 4 milliGRAM(s) Oral every 8 hours PRN  pantoprazole    Tablet 40 milliGRAM(s) Oral before breakfast      Lab      Radiology Neurology Follow up note      Name  RHEA MORENO    HPI:  25 yo F w/ no significant PMH presents with Rt facial numbness beginning 2 days ago. Denies other focal numbness or weakness. Admits to hx of impacted molars bilaterally. Had hx of workup for lupus which was negative. Denies fever, chills, CP, SOB, LE pain or swelling, recent travel or exogenous hormone use. Denies malar rash.    Family hx positive for Lupus, Sjogren's disease (both, mother), and Hashimoto's (aunt)    In the ED, pt was found to be hypertensive at 245/128. Neurology consulted and recommended CTH and MRI head. CTH negative for acute pathology. MRI pending. Nicardipine gtt started and BP decreased and sxs resolved. ICU consulted for hypertensive emergency requiring nicardipine gtt.     Vital Signs Last 24 Hrs  T(C): 36.3 (11 Aug 2019 03:29), Max: 36.3 (11 Aug 2019 03:29)  T(F): 97.4 (11 Aug 2019 03:29), Max: 97.4 (11 Aug 2019 03:29)  HR: 124 (11 Aug 2019 06:30) (112 - 143)  BP: 174/83 (11 Aug 2019 06:30) (174/83 - 245/128)  BP(mean): --  RR: 16 (11 Aug 2019 06:30) (16 - 22)  SpO2: 98% (11 Aug 2019 06:30) (98% - 100%) (11 Aug 2019 06:50)      Interval History - No acute mental status changes. Intermittantly off nicardipine and restarted overnight.           Vital Signs Last 24 Hrs  T(C): 36.8 (12 Aug 2019 00:00), Max: 36.8 (11 Aug 2019 07:00)  T(F): 98.2 (12 Aug 2019 00:00), Max: 98.3 (11 Aug 2019 07:00)  HR: 76 (12 Aug 2019 02:00) (76 - 143)  BP: 151/86 (12 Aug 2019 02:00) (131/67 - 245/128)  BP(mean): 106 (12 Aug 2019 02:00) (91 - 138)  RR: 15 (12 Aug 2019 02:00) (12 - 27)  SpO2: 98% (12 Aug 2019 02:00) (97% - 100%)          Neurological Exam:   Mental status: Awake, alert and oriented x3.  Recent and remote memory intact.  Naming, repetition and comprehension intact.  Attention/concentration intact.  No dysarthria, no aphasia.  Fund of knowledge appropriate.    Cranial nerves: Fundoscopic exam demonstrated no abnormalities, pupils equally round and reactive to light, visual fields full, no nystagmus, extraocular muscles intact, V1 through V3 intact bilaterally and symmetric, face symmetric, hearing intact to finger rub, palate elevation symmetric, tongue was midline, sternocleidomastoid/shoulder shrug strength bilaterally 5/5.    Motor:  Normal bulk and tone, strength 5/5 in bilateral upper and lower extremities.   strength 5/5.  Rapid alternating movements intact and symmetric.   Sensation: Intact to light touch, proprioception, and pinprick.  No neglect.   Coordination: No dysmetria on finger-to-nose and heel-to-shin.  No clumsiness.  Reflexes: 2+ in upper and lower extremities, downgoing toes bilaterally  Gait: Narrow and steady. No ataxia.  Romberg negative    Medications  chlorhexidine 2% Cloths 1 Application(s) Topical <User Schedule>  heparin  Injectable 5000 Unit(s) SubCutaneous every 12 hours  labetalol 100 milliGRAM(s) Oral every 8 hours  niCARdipine Infusion 5 mG/Hr IV Continuous <Continuous>  ondansetron    Tablet 4 milliGRAM(s) Oral every 8 hours PRN  pantoprazole    Tablet 40 milliGRAM(s) Oral before breakfast      Lab      Radiology Neurology Follow up note      Name  RHEA MORENO    HPI:  25 yo F w/ no significant PMH presents with Rt facial numbness beginning 2 days ago. Denies other focal numbness or weakness. Admits to hx of impacted molars bilaterally. Had hx of workup for lupus which was negative. Denies fever, chills, CP, SOB, LE pain or swelling, recent travel or exogenous hormone use. Denies malar rash.    Family hx positive for Lupus, Sjogren's disease (both, mother), and Hashimoto's (aunt)    In the ED, pt was found to be hypertensive at 245/128. Neurology consulted and recommended CTH and MRI head. CTH negative for acute pathology. MRI pending. Nicardipine gtt started and BP decreased and sxs resolved. ICU consulted for hypertensive emergency requiring nicardipine gtt.     Vital Signs Last 24 Hrs  T(C): 36.3 (11 Aug 2019 03:29), Max: 36.3 (11 Aug 2019 03:29)  T(F): 97.4 (11 Aug 2019 03:29), Max: 97.4 (11 Aug 2019 03:29)  HR: 124 (11 Aug 2019 06:30) (112 - 143)  BP: 174/83 (11 Aug 2019 06:30) (174/83 - 245/128)  BP(mean): --  RR: 16 (11 Aug 2019 06:30) (16 - 22)  SpO2: 98% (11 Aug 2019 06:30) (98% - 100%) (11 Aug 2019 06:50)      Interval History - No acute mental status changes. Intermittantly off nicardipine and started on PO labetatlol. pt OOB ambulating in [address]. pt states she has some residual Right side tinlging in Face  less than earlier.           Vital Signs Last 24 Hrs  T(C): 36.8 (12 Aug 2019 00:00), Max: 36.8 (11 Aug 2019 07:00)  T(F): 98.2 (12 Aug 2019 00:00), Max: 98.3 (11 Aug 2019 07:00)  HR: 76 (12 Aug 2019 02:00) (76 - 143)  BP: 151/86 (12 Aug 2019 02:00) (131/67 - 245/128)  BP(mean): 106 (12 Aug 2019 02:00) (91 - 138)  RR: 15 (12 Aug 2019 02:00) (12 - 27)  SpO2: 98% (12 Aug 2019 02:00) (97% - 100%)          Neurological Exam:   Mental status: Awake, alert and oriented x3.  Recent and remote memory intact.  Naming, repetition and comprehension intact.  Attention/concentration intact.  No dysarthria, no aphasia.  Fund of knowledge appropriate.    Cranial nerves: Fundoscopic exam demonstrated no abnormalities, pupils equally round and reactive to light, visual fields full, no nystagmus, extraocular muscles intact, V1 through V3 intact bilaterally and symmetric, face symmetric, hearing intact to finger rub, palate elevation symmetric, tongue was midline, sternocleidomastoid/shoulder shrug strength bilaterally 5/5.    Motor:  Normal bulk and tone, strength 5/5 in bilateral upper and lower extremities.   strength 5/5.  Rapid alternating movements intact and symmetric.   Sensation: Intact to light touch, proprioception, and pinprick.  No neglect.   Coordination: No dysmetria on finger-to-nose and heel-to-shin.  No clumsiness.  Reflexes: 2+ in upper and lower extremities, downgoing toes bilaterally  Gait: Narrow and steady. No ataxia.  Romberg negative    NIH 0 GCS 15    Medications  chlorhexidine 2% Cloths 1 Application(s) Topical <User Schedule>  heparin  Injectable 5000 Unit(s) SubCutaneous every 12 hours  labetalol 100 milliGRAM(s) Oral every 8 hours  niCARdipine Infusion 5 mG/Hr IV Continuous <Continuous>  ondansetron    Tablet 4 milliGRAM(s) Oral every 8 hours PRN  pantoprazole    Tablet 40 milliGRAM(s) Oral before breakfast      Lab      Radiology

## 2019-08-12 NOTE — CONSULT NOTE ADULT - ASSESSMENT
25 yo F w/ no significant PMH presents with Rt facial numbness beginning 2 days ago. Denies other focal numbness or weakness. Admits to hx of impacted molars bilaterally. Had hx of workup for lupus which was negative. Denies fever, chills, CP, SOB, LE pain or swelling, recent travel or exogenous hormone use. Denies malar rash.    1) Hypertension stable at this time   Continue with Labetalol 100 every 8 hours   Follow with renal vasculature ultrasound rule out stenosis   Follow with workup for Pheo 23 yo F w/ no significant PMH presents with Rt facial numbness beginning 2 days ago. Denies other focal numbness or weakness. Admits to hx of impacted molars bilaterally. Had hx of workup for lupus which was negative. Denies fever, chills, CP, SOB, LE pain or swelling, recent travel or exogenous hormone use. Denies malar rash.    1) Hypertension urgency  stable at this time   Continue with Labetalol 100 every 8 hours   Follow with renal vasculature ultrasound rule out stenosis/ renal bladder sono  UA and spot protein creat ratio   Follow with workup for Pheo

## 2019-08-12 NOTE — PROGRESS NOTE ADULT - ASSESSMENT
23 yo F w/ no significant PMH presents with Rt facial numbness. Found to have systolic BP in the 250s. BP now controlled and numbness resolved.    #Hypertensive emergency  -BP better controlled  -On labetalol 100 q 8  -Nicardipine drip d/c   - CT head and MRI negative for intracranial pathology  -ECHO shows grade 1 diastolic dysfunction, mitral regurge  -Urine tox negative  -Urine metanephrine pending  -Blood and urine cultures pending  -Kidney US and Renal Artery US pending    Diet: bowers h  GI: pantoprazole  dvt ppx: Heperrin [id number]    Dispo: Downgrade when bed available in neuro unit

## 2019-08-12 NOTE — PROGRESS NOTE ADULT - ASSESSMENT
IMPRESSION:    Hypertensive emergency resolved    PLAN:    CNS:  - avoid sedation, FU with neuro     HEENT:  - oral care    PULMONARY:  - keep head between 30 - 45 degrees.     CARDIOVASCULAR:  - begin labetolol PO 100mg q8h   - ECHO    GI:   - GI prophylaxis  - Feeding     RENAL:  - f/u Urine metanephrine   - f/u urine tox   - Renal eval    INFECTIOUS DISEASE:  - f/u pancultures     HEMATOLOGICAL:    - DVT prophylaxis.    ENDOCRINE:    - Follow up FS  - Insulin protocol if needed.    MUSCULOSKELETAL:  -  OOB to chair    Transfer to medical floor IMPRESSION:    Hypertensive emergency resolved    PLAN:    CNS:  - avoid sedation, FU with neuro     HEENT:  - oral care    PULMONARY:  - keep head between 30 - 45 degrees.     CARDIOVASCULAR:  - begin labetolol PO 100mg q8h   - ECHO    GI:   - GI prophylaxis  - Feeding     RENAL:  - f/u Urine metanephrine   - f/u urine tox   - renal doppler  - Renal eval    INFECTIOUS DISEASE:  - f/u pancultures     HEMATOLOGICAL:    - DVT prophylaxis.    ENDOCRINE:    - Follow up FS  - Insulin protocol if needed.    MUSCULOSKELETAL:  -  OOB to chair    Transfer to medical floor IMPRESSION:    Hypertensive emergency resolved    PLAN:    CNS:  - avoid sedation, FU with neuro     HEENT:  - oral care    PULMONARY:  - keep head between 30 - 45 degrees.     CARDIOVASCULAR:  - Continue BP control       GI:   - GI prophylaxis  - Feeding     RENAL:  - FU with renal     INFECTIOUS DISEASE:  - f/u pancultures     HEMATOLOGICAL:    - DVT prophylaxis.    ENDOCRINE:    - Follow up FS      MUSCULOSKELETAL:  -  OOB to chair    Transfer to medical floor

## 2019-08-13 ENCOUNTER — TRANSCRIPTION ENCOUNTER (OUTPATIENT)
Age: 24
End: 2019-08-13

## 2019-08-13 VITALS
HEART RATE: 89 BPM | TEMPERATURE: 98 F | SYSTOLIC BLOOD PRESSURE: 168 MMHG | DIASTOLIC BLOOD PRESSURE: 81 MMHG | RESPIRATION RATE: 19 BRPM

## 2019-08-13 LAB
ALBUMIN SERPL ELPH-MCNC: 3.7 G/DL — SIGNIFICANT CHANGE UP (ref 3.5–5.2)
ALP SERPL-CCNC: 106 U/L — SIGNIFICANT CHANGE UP (ref 30–115)
ALT FLD-CCNC: 18 U/L — SIGNIFICANT CHANGE UP (ref 0–41)
ANION GAP SERPL CALC-SCNC: 12 MMOL/L — SIGNIFICANT CHANGE UP (ref 7–14)
AST SERPL-CCNC: 14 U/L — SIGNIFICANT CHANGE UP (ref 0–41)
BASOPHILS # BLD AUTO: 0.05 K/UL — SIGNIFICANT CHANGE UP (ref 0–0.2)
BASOPHILS NFR BLD AUTO: 0.5 % — SIGNIFICANT CHANGE UP (ref 0–1)
BILIRUB SERPL-MCNC: 0.3 MG/DL — SIGNIFICANT CHANGE UP (ref 0.2–1.2)
BUN SERPL-MCNC: 14 MG/DL — SIGNIFICANT CHANGE UP (ref 10–20)
CALCIUM SERPL-MCNC: 9.3 MG/DL — SIGNIFICANT CHANGE UP (ref 8.5–10.1)
CHLORIDE SERPL-SCNC: 101 MMOL/L — SIGNIFICANT CHANGE UP (ref 98–110)
CO2 SERPL-SCNC: 25 MMOL/L — SIGNIFICANT CHANGE UP (ref 17–32)
CREAT SERPL-MCNC: 0.8 MG/DL — SIGNIFICANT CHANGE UP (ref 0.7–1.5)
EOSINOPHIL # BLD AUTO: 0.11 K/UL — SIGNIFICANT CHANGE UP (ref 0–0.7)
EOSINOPHIL NFR BLD AUTO: 1.1 % — SIGNIFICANT CHANGE UP (ref 0–8)
GLUCOSE SERPL-MCNC: 85 MG/DL — SIGNIFICANT CHANGE UP (ref 70–99)
HCT VFR BLD CALC: 40.9 % — SIGNIFICANT CHANGE UP (ref 37–47)
HGB BLD-MCNC: 13.3 G/DL — SIGNIFICANT CHANGE UP (ref 12–16)
IMM GRANULOCYTES NFR BLD AUTO: 0.2 % — SIGNIFICANT CHANGE UP (ref 0.1–0.3)
LYMPHOCYTES # BLD AUTO: 3.92 K/UL — HIGH (ref 1.2–3.4)
LYMPHOCYTES # BLD AUTO: 39 % — SIGNIFICANT CHANGE UP (ref 20.5–51.1)
MAGNESIUM SERPL-MCNC: 2.1 MG/DL — SIGNIFICANT CHANGE UP (ref 1.8–2.4)
MCHC RBC-ENTMCNC: 29.2 PG — SIGNIFICANT CHANGE UP (ref 27–31)
MCHC RBC-ENTMCNC: 32.5 G/DL — SIGNIFICANT CHANGE UP (ref 32–37)
MCV RBC AUTO: 89.9 FL — SIGNIFICANT CHANGE UP (ref 81–99)
MONOCYTES # BLD AUTO: 0.69 K/UL — HIGH (ref 0.1–0.6)
MONOCYTES NFR BLD AUTO: 6.9 % — SIGNIFICANT CHANGE UP (ref 1.7–9.3)
NEUTROPHILS # BLD AUTO: 5.27 K/UL — SIGNIFICANT CHANGE UP (ref 1.4–6.5)
NEUTROPHILS NFR BLD AUTO: 52.3 % — SIGNIFICANT CHANGE UP (ref 42.2–75.2)
NRBC # BLD: 0 /100 WBCS — SIGNIFICANT CHANGE UP (ref 0–0)
PHOSPHATE SERPL-MCNC: 4.1 MG/DL — SIGNIFICANT CHANGE UP (ref 2.1–4.9)
PLATELET # BLD AUTO: 308 K/UL — SIGNIFICANT CHANGE UP (ref 130–400)
POTASSIUM SERPL-MCNC: 4.5 MMOL/L — SIGNIFICANT CHANGE UP (ref 3.5–5)
POTASSIUM SERPL-SCNC: 4.5 MMOL/L — SIGNIFICANT CHANGE UP (ref 3.5–5)
PROT SERPL-MCNC: 6.8 G/DL — SIGNIFICANT CHANGE UP (ref 6–8)
RBC # BLD: 4.55 M/UL — SIGNIFICANT CHANGE UP (ref 4.2–5.4)
RBC # FLD: 12.9 % — SIGNIFICANT CHANGE UP (ref 11.5–14.5)
SODIUM SERPL-SCNC: 138 MMOL/L — SIGNIFICANT CHANGE UP (ref 135–146)
T3 SERPL-MCNC: 152 NG/DL — SIGNIFICANT CHANGE UP (ref 80–200)
T4 AB SER-ACNC: 9.1 UG/DL — SIGNIFICANT CHANGE UP (ref 4.6–12)
TSH SERPL-MCNC: 7 UIU/ML — HIGH (ref 0.27–4.2)
WBC # BLD: 10.06 K/UL — SIGNIFICANT CHANGE UP (ref 4.8–10.8)
WBC # FLD AUTO: 10.06 K/UL — SIGNIFICANT CHANGE UP (ref 4.8–10.8)

## 2019-08-13 PROCEDURE — 99238 HOSP IP/OBS DSCHRG MGMT 30/<: CPT

## 2019-08-13 RX ORDER — LABETALOL HCL 100 MG
1 TABLET ORAL
Qty: 120 | Refills: 0
Start: 2019-08-13 | End: 2019-09-11

## 2019-08-13 RX ORDER — AMLODIPINE BESYLATE 2.5 MG/1
1 TABLET ORAL
Qty: 30 | Refills: 0
Start: 2019-08-13 | End: 2019-09-11

## 2019-08-13 RX ORDER — LABETALOL HCL 100 MG
100 TABLET ORAL ONCE
Refills: 0 | Status: COMPLETED | OUTPATIENT
Start: 2019-08-13 | End: 2019-08-13

## 2019-08-13 RX ADMIN — PANTOPRAZOLE SODIUM 40 MILLIGRAM(S): 20 TABLET, DELAYED RELEASE ORAL at 08:19

## 2019-08-13 RX ADMIN — AMLODIPINE BESYLATE 5 MILLIGRAM(S): 2.5 TABLET ORAL at 05:16

## 2019-08-13 RX ADMIN — HEPARIN SODIUM 5000 UNIT(S): 5000 INJECTION INTRAVENOUS; SUBCUTANEOUS at 05:16

## 2019-08-13 RX ADMIN — Medication 100 MILLIGRAM(S): at 11:04

## 2019-08-13 RX ADMIN — Medication 100 MILLIGRAM(S): at 05:16

## 2019-08-13 NOTE — PROGRESS NOTE ADULT - ASSESSMENT
23 yo F w/ no significant PMH presents with Rt facial numbness. Found to have systolic BP in the 250s.  Treated for Hypertensive urgency now resolved.    cont labetalol at home    can f/u with PCP for outpatient workup of secondary causes of hypertension, including metanephrines        #Progress Note Handoff    Pending (specify):  Consults_________, Tests________, Test Results_urine metanephrines_____, Other_________    Family discussion:  ptaao3/3 and family at bedside updated and aware, in agreement w care plan    Disposition: Home_x__/SNF___/Other________/Unknown at this time________  x

## 2019-08-13 NOTE — DISCHARGE NOTE PROVIDER - CARE PROVIDER_API CALL
PCP,   Phone: (   )    -  Fax: (   )    -  Follow Up Time: [place] Curiel  [address] [address]  Phone: [phone]  Fax: (   )    -  Follow Up Time:

## 2019-08-13 NOTE — DISCHARGE NOTE NURSING/CASE MANAGEMENT/SOCIAL WORK - NSDCDPATPORTLINK_GEN_ALL_CORE
You can access the [facility] Patient Portal, offered by [facility], by registering with the following website: [email or web address]

## 2019-08-13 NOTE — DISCHARGE NOTE PROVIDER - NSDCCPCAREPLAN_GEN_ALL_CORE_FT
PRINCIPAL DISCHARGE DIAGNOSIS  Diagnosis: Hypertensive urgency  Assessment and Plan of Treatment: Please continue taking your labetalol as prescribed.  Please follow up with your primary care provider      SECONDARY DISCHARGE DIAGNOSES  Diagnosis: Elevated TSH  Assessment and Plan of Treatment: Please follow up with primary care provider    Diagnosis: Malignant hypertension  Assessment and Plan of Treatment:     Diagnosis: PRES (posterior reversible encephalopathy syndrome)  Assessment and Plan of Treatment:     Diagnosis: Numbness of face  Assessment and Plan of Treatment:

## 2019-08-13 NOTE — PROGRESS NOTE ADULT - SUBJECTIVE AND OBJECTIVE BOX
SUBJECTIVE  no new complaints      OBJECTIVE  Vital Signs Last 24 Hrs  T(C): 36.5 (13 Aug 2019 12:53), Max: 36.7 (12 Aug 2019 21:37)  T(F): 97.7 (13 Aug 2019 12:53), Max: 98.1 (12 Aug 2019 21:37)  HR: 89 (13 Aug 2019 12:53) (76 - 100)  BP: 168/81 (13 Aug 2019 12:53) (162/77 - 197/88)  BP(mean): 119 (12 Aug 2019 20:00) (119 - 141)  RR: 19 (13 Aug 2019 12:53) (17 - 21)  SpO2: 98% (12 Aug 2019 20:00) (97% - 100%)      PHYSICAL EXAM:  PHYSICAL EXAM:  GENERAL: NAD, AAO x 4, 24y F  HEAD:  Atraumatic, Normocephalic  EYES: EOMI, conjunctiva and sclera white  NECK: Supple, No JVD  CHEST/LUNG: Clear to auscultation bilaterally; No wheeze; No crackles; No accessory muscles used  HEART: Regular rate and rhythm; No murmurs;   ABDOMEN: Soft, Nontender, Nondistended; Bowel sounds present; No guarding  EXTREMITIES:  2+ Peripheral Pulses, No cyanosis or edema  NEUROLOGY: non-focal    - CT head and MRI negative for intracranial pathology  -ECHO shows grade 1 diastolic dysfunction, mitral regurge

## 2019-08-13 NOTE — DISCHARGE NOTE PROVIDER - PROVIDER TOKENS
FREE:[LAST:[PCP],PHONE:[(   )    -],FAX:[(   )    -]] FREE:[LAST:[[place]],FIRST:[[place]],PHONE:[phone]],FAX:[(   )    -],ADDRESS:[[address] [place] [address]]]

## 2019-08-13 NOTE — DISCHARGE NOTE PROVIDER - HOSPITAL COURSE
25 yo F w/ no significant PMH presents with Rt facial numbness beginning 2 days ago. Denies other focal numbness or weakness. Admits to hx of impacted molars bilaterally. Had hx of workup for lupus which was negative. Denies fever, chills, CP, SOB, LE pain or swelling, recent travel or exogenous hormone use. Denies malar rash.        Family hx positive for Lupus, Sjogren's disease (both, mother), and Hashimoto's (aunt)        In the ED, pt was found to be hypertensive at 245/128. Neurology consulted and recommended CTH and MRI head. CTH negative for acute pathology. MRI pending. Nicardipine gtt started and BP decreased and sxs resolved. ICU consulted for hypertensive emergency requiring nicardipine gtt.         After controlling her BP, nicardipine was switched to labetalol 100mg q8hr. Patient reports feeling better and would like to go home.        Workup for pheochromocytoma has been normal. To follow up outpatient with PCP for further workup and evaluation of HTN.        CTH and MRI head - normal    Blood cultures - no growth    Renal ultrasound - normal    UA - normal    Echo - grade 1 diastolic dysfunction and mitral regurg        Yet to return: Urine metanephrines

## 2019-08-14 LAB — ANA TITR SER: NEGATIVE — SIGNIFICANT CHANGE UP

## 2019-08-15 LAB — METANEPH UR-MCNC: SIGNIFICANT CHANGE UP

## 2019-08-16 DIAGNOSIS — R29.810 FACIAL WEAKNESS: ICD-10-CM

## 2019-08-16 DIAGNOSIS — R79.89 OTHER SPECIFIED ABNORMAL FINDINGS OF BLOOD CHEMISTRY: ICD-10-CM

## 2019-08-16 DIAGNOSIS — I16.0 HYPERTENSIVE URGENCY: ICD-10-CM

## 2019-08-16 LAB
CULTURE RESULTS: SIGNIFICANT CHANGE UP
METANEPHRINE, PL: 14 PG/ML — SIGNIFICANT CHANGE UP (ref 0–62)
NORMETANEPHRINE, PL: 205 PG/ML — HIGH (ref 0–145)
SPECIMEN SOURCE: SIGNIFICANT CHANGE UP

## 2019-08-17 LAB
CULTURE RESULTS: SIGNIFICANT CHANGE UP
SPECIMEN SOURCE: SIGNIFICANT CHANGE UP
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
## 2022-07-03 ENCOUNTER — EMERGENCY (EMERGENCY)
Facility: HOSPITAL | Age: 27
LOS: 0 days | Discharge: HOME | End: 2022-07-03
Attending: STUDENT IN AN ORGANIZED HEALTH CARE EDUCATION/TRAINING PROGRAM | Admitting: EMERGENCY MEDICINE

## 2022-07-03 VITALS
SYSTOLIC BLOOD PRESSURE: 190 MMHG | TEMPERATURE: 98 F | OXYGEN SATURATION: 99 % | RESPIRATION RATE: 18 BRPM | HEART RATE: 88 BPM | DIASTOLIC BLOOD PRESSURE: 101 MMHG

## 2022-07-03 DIAGNOSIS — Z98.890 OTHER SPECIFIED POSTPROCEDURAL STATES: Chronic | ICD-10-CM

## 2022-07-03 DIAGNOSIS — F17.200 NICOTINE DEPENDENCE, UNSPECIFIED, UNCOMPLICATED: ICD-10-CM

## 2022-07-03 DIAGNOSIS — R20.0 ANESTHESIA OF SKIN: ICD-10-CM

## 2022-07-03 DIAGNOSIS — I10 ESSENTIAL (PRIMARY) HYPERTENSION: ICD-10-CM

## 2022-07-03 DIAGNOSIS — Z88.2 ALLERGY STATUS TO SULFONAMIDES: ICD-10-CM

## 2022-07-03 DIAGNOSIS — Z82.49 FAMILY HISTORY OF ISCHEMIC HEART DISEASE AND OTHER DISEASES OF THE CIRCULATORY SYSTEM: ICD-10-CM

## 2022-07-03 DIAGNOSIS — Z20.822 CONTACT WITH AND (SUSPECTED) EXPOSURE TO COVID-19: ICD-10-CM

## 2022-07-03 LAB
ALBUMIN SERPL ELPH-MCNC: 4.8 G/DL — SIGNIFICANT CHANGE UP (ref 3.5–5.2)
ALP SERPL-CCNC: 91 U/L — SIGNIFICANT CHANGE UP (ref 30–115)
ALT FLD-CCNC: 10 U/L — SIGNIFICANT CHANGE UP (ref 0–41)
ANION GAP SERPL CALC-SCNC: 13 MMOL/L — SIGNIFICANT CHANGE UP (ref 7–14)
APTT BLD: 38.4 SEC — SIGNIFICANT CHANGE UP (ref 27–39.2)
AST SERPL-CCNC: 15 U/L — SIGNIFICANT CHANGE UP (ref 0–41)
BASOPHILS # BLD AUTO: 0.05 K/UL — SIGNIFICANT CHANGE UP (ref 0–0.2)
BASOPHILS NFR BLD AUTO: 0.4 % — SIGNIFICANT CHANGE UP (ref 0–1)
BILIRUB SERPL-MCNC: 0.3 MG/DL — SIGNIFICANT CHANGE UP (ref 0.2–1.2)
BUN SERPL-MCNC: 10 MG/DL — SIGNIFICANT CHANGE UP (ref 10–20)
CALCIUM SERPL-MCNC: 10.4 MG/DL — HIGH (ref 8.5–10.1)
CHLORIDE SERPL-SCNC: 105 MMOL/L — SIGNIFICANT CHANGE UP (ref 98–110)
CO2 SERPL-SCNC: 25 MMOL/L — SIGNIFICANT CHANGE UP (ref 17–32)
CREAT SERPL-MCNC: 0.8 MG/DL — SIGNIFICANT CHANGE UP (ref 0.7–1.5)
EGFR: 103 ML/MIN/1.73M2 — SIGNIFICANT CHANGE UP
EOSINOPHIL # BLD AUTO: 0.04 K/UL — SIGNIFICANT CHANGE UP (ref 0–0.7)
EOSINOPHIL NFR BLD AUTO: 0.3 % — SIGNIFICANT CHANGE UP (ref 0–8)
GLUCOSE SERPL-MCNC: 105 MG/DL — HIGH (ref 70–99)
HCT VFR BLD CALC: 41 % — SIGNIFICANT CHANGE UP (ref 37–47)
HGB BLD-MCNC: 14.1 G/DL — SIGNIFICANT CHANGE UP (ref 12–16)
IMM GRANULOCYTES NFR BLD AUTO: 0.3 % — SIGNIFICANT CHANGE UP (ref 0.1–0.3)
INR BLD: 1.04 RATIO — SIGNIFICANT CHANGE UP (ref 0.65–1.3)
LYMPHOCYTES # BLD AUTO: 1.69 K/UL — SIGNIFICANT CHANGE UP (ref 1.2–3.4)
LYMPHOCYTES # BLD AUTO: 14.6 % — LOW (ref 20.5–51.1)
MCHC RBC-ENTMCNC: 30.5 PG — SIGNIFICANT CHANGE UP (ref 27–31)
MCHC RBC-ENTMCNC: 34.4 G/DL — SIGNIFICANT CHANGE UP (ref 32–37)
MCV RBC AUTO: 88.6 FL — SIGNIFICANT CHANGE UP (ref 81–99)
MONOCYTES # BLD AUTO: 0.75 K/UL — HIGH (ref 0.1–0.6)
MONOCYTES NFR BLD AUTO: 6.5 % — SIGNIFICANT CHANGE UP (ref 1.7–9.3)
NEUTROPHILS # BLD AUTO: 8.98 K/UL — HIGH (ref 1.4–6.5)
NEUTROPHILS NFR BLD AUTO: 77.9 % — HIGH (ref 42.2–75.2)
NRBC # BLD: 0 /100 WBCS — SIGNIFICANT CHANGE UP (ref 0–0)
PLATELET # BLD AUTO: 323 K/UL — SIGNIFICANT CHANGE UP (ref 130–400)
POTASSIUM SERPL-MCNC: 4.5 MMOL/L — SIGNIFICANT CHANGE UP (ref 3.5–5)
POTASSIUM SERPL-SCNC: 4.5 MMOL/L — SIGNIFICANT CHANGE UP (ref 3.5–5)
PROT SERPL-MCNC: 7.6 G/DL — SIGNIFICANT CHANGE UP (ref 6–8)
PROTHROM AB SERPL-ACNC: 12 SEC — SIGNIFICANT CHANGE UP (ref 9.95–12.87)
RBC # BLD: 4.63 M/UL — SIGNIFICANT CHANGE UP (ref 4.2–5.4)
RBC # FLD: 12.4 % — SIGNIFICANT CHANGE UP (ref 11.5–14.5)
SARS-COV-2 RNA SPEC QL NAA+PROBE: SIGNIFICANT CHANGE UP
SODIUM SERPL-SCNC: 143 MMOL/L — SIGNIFICANT CHANGE UP (ref 135–146)
TROPONIN T SERPL-MCNC: <0.01 NG/ML — SIGNIFICANT CHANGE UP
WBC # BLD: 11.55 K/UL — HIGH (ref 4.8–10.8)
WBC # FLD AUTO: 11.55 K/UL — HIGH (ref 4.8–10.8)

## 2022-07-03 PROCEDURE — 99220: CPT

## 2022-07-03 PROCEDURE — 70450 CT HEAD/BRAIN W/O DYE: CPT | Mod: 26,MA

## 2022-07-03 PROCEDURE — 93010 ELECTROCARDIOGRAM REPORT: CPT

## 2022-07-03 PROCEDURE — 99283 EMERGENCY DEPT VISIT LOW MDM: CPT

## 2022-07-03 NOTE — CONSULT NOTE ADULT - SUBJECTIVE AND OBJECTIVE BOX
**STROKE CODE CONSULT NOTE**    Last known well time/Time of onset of symptoms: at midnight     HPI: 27y Female with PMHx of HTN presenting with numbness in the left leg below the knee, and Lt arm below the elbow. No blurry vision, diplopia, headache, or neck pain, weakness, slurred speech, or recent head or neck trauma.   she is on antibiotic for UTI for burning sensation in urine.   she never had similar symptoms, and currently she feels better     T(C): 36.8 (07-03-22 @ 10:37), Max: 36.8 (07-03-22 @ 09:45)  HR: 88 (07-03-22 @ 10:37) (88 - 88)  BP: 190/101 (07-03-22 @ 10:37) (190/101 - 190/101)  RR: 17 (07-03-22 @ 10:37) (17 - 18)  SpO2: 99% (07-03-22 @ 10:37) (99% - 99%)    PAST MEDICAL & SURGICAL HISTORY:  HTN    FAMILY HISTORY:  Family history of systemic lupus erythematosus    Family history of Hashimoto thyroiditis        SOCIAL HISTORY:   Patient lives with   Smoking status: no  Drinking: no  Drug Use:  no    ROS:   Constitutional: No fever, weight loss or fatigue  Eyes: No eye pain, visual disturbances, or discharge  ENMT:  No difficulty hearing, tinnitus; No sinus or throat pain  Neck: No pain or stiffness  Respiratory: No cough, wheezing, chills or hemoptysis  Cardiovascular: No chest pain, palpitations, shortness of breath, or leg swelling  Gastrointestinal: No abdominal pain. No nausea, vomiting or hematemesis; No diarrhea or constipation. Nohematochezia.  Genitourinary: No dysuria, frequency, hematuria or incontinence  Neurological: As per HPI  Skin: No itching, burning, rashes or lesions   Endocrine: No heat or cold intolerance; No hair loss  Musculoskeletal: No joint pain or swelling; No muscle, back or extremity pain  Heme/Lymph: No easy bruising or bleeding gums    MEDICATIONS  (STANDING):    MEDICATIONS  (PRN):    Allergies    Sulfacetamide Sodium (Unknown)    Intolerances      Vital Signs Last 24 Hrs  T(C): 36.8 (03 Jul 2022 10:37), Max: 36.8 (03 Jul 2022 09:45)  T(F): 98.3 (03 Jul 2022 10:37), Max: 98.3 (03 Jul 2022 10:37)  HR: 88 (03 Jul 2022 10:37) (88 - 88)  BP: 190/101 (03 Jul 2022 10:37) (190/101 - 190/101)  BP(mean): --  RR: 17 (03 Jul 2022 10:37) (17 - 18)  SpO2: 99% (03 Jul 2022 10:37) (99% - 99%)    Physical exam:  Constitutional: No acute distress, conversant  Eyes: Anicteric sclerae, moist conjunctivae, see below for CNs  Neck: trachea midline, FROM, supple, no thyromegaly or lymphadenopathy  Cardiovascular: Regular rate and rhythm, no murmurs, rubs, or gallops. No carotid bruits.   Pulmonary: Anterior breath sounds clear bilaterally, no crackles or wheezing. No use of accessory muscles  GI: Abdomen soft, non-distended, non-tender  Extremities: Radial and DP pulses +2, no edema    Neurologic:  Mental status: Awake, alert, oriented to person, place, and time. Speech is fluent with intact naming, repetition, and comprehension, no dysarthria. Recent and remote memory intact. Follows commands. Attention/concentration intact. Fund of knowledge appropriate.  Cranial nerves:   - II: Visual fields are full to confrontation.  - III, IV, VI: Extraocular movements are intact without nystagmus. Pupils equally round and reactive to light  - V:  Facial sensation V1-V3 equal and intact   - VII: Face is symmetric with normal eye closure and smile  - VIII: Hearing is bilaterally intact to finger rub  - IX, X: Uvula is midline and soft palate rises symmetrically  - XI: Head turning and shoulder shrug are intact.  - XII: Tongue protrudes midline  Motor: Normal bulk and tone. No pronator drift. Strength bilateral upper extremity 5/5, bilateral lower extremities 5/5.  Rapid alternating movements intact and symmetric  Sensation: Intact to light touch bilaterally. No neglect or extinction on double simultaneous testing.  Coordination: No dysmetria on finger-to-nose and heel-to-shin bilaterally  Reflexes: Downgoing toes bilaterally       NIHSS: 0 ASPECT Score: 10     Fingerstick Blood Glucose: CAPILLARY BLOOD GLUCOSE  106 (03 Jul 2022 12:03)      POCT Blood Glucose.: 106 mg/dL (03 Jul 2022 10:43)    LABS:                        14.1   11.55 )-----------( 323      ( 03 Jul 2022 11:03 )             41.0     07-03    143  |  105  |  10  ----------------------------<  105<H>  4.5   |  25  |  0.8    Ca    10.4<H>      03 Jul 2022 11:03    TPro  7.6  /  Alb  4.8  /  TBili  0.3  /  DBili  x   /  AST  15  /  ALT  10  /  AlkPhos  91  07-03    PT/INR - ( 03 Jul 2022 11:03 )   PT: 12.00 sec;   INR: 1.04 ratio         PTT - ( 03 Jul 2022 11:03 )  PTT:38.4 sec  CARDIAC MARKERS ( 03 Jul 2022 11:03 )  x     / <0.01 ng/mL / x     / x     / x              RADIOLOGY & ADDITIONAL STUDIES:      -----------------------------------------------------------------------------------------------------------------

## 2022-07-03 NOTE — ED PROVIDER NOTE - PHYSICAL EXAMINATION
CONSTITUTIONAL: NAD  SKIN: Warm dry  HEAD: NCAT  EYES: NL inspection  ENT: MMM  NECK: Supple; non tender.  CARD: RRR  RESP: CTAB  ABD: S/NT no R/G  EXT: no pedal edema  Motor strength intact bl, sensation intact bl  NEURO: Grossly unremarkable, cn 2-12 groslly intact, neg rombergs   PSYCH: Cooperative, appropriate.

## 2022-07-03 NOTE — CONSULT NOTE ADULT - ASSESSMENT
This 27y old female with pMH of HTN and presenting with Lt side numbness. NIHSS was not significant (0). CTH was not significant   She does not have headache, and no history of migraine, which make this diagnosis less likely. No jerky movements or LOC and the symptoms are  still present which is not in support of seizure.   In her age a small stroke is less likely, but she has PMH of HTN and would consider MR to r/o  Other issues to consider is demyelinating lesions     Plan:  - Keep in observation   - MR brain wo contrast   - BP monitor with Vital Q8H

## 2022-07-03 NOTE — ED CDU PROVIDER INITIAL DAY NOTE - CHPI ED SYMPTOMS NEG
no blurred vision/no confusion/no dizziness/no loss of consciousness/no nausea/no vomiting/no weakness

## 2022-07-03 NOTE — ED CDU PROVIDER INITIAL DAY NOTE - ATTENDING APP SHARED VISIT CONTRIBUTION OF CARE
26 yo F pmh htn pw numbness. Pt endorsing numbness over the L arm and leg around 12:30 am on day of arrival. Pt denies other complaints. Seen in the ED and placed in EDOU. No cp, no sob, no abd pain, no ha, no weakness, no facial droop, no speech changes, no n/v/d.     CONSTITUTIONAL: Well-developed; well-nourished; in no acute distress. Sitting up and providing appropriate history and physical examination  SKIN: skin exam is warm and dry, no acute rash.  HEAD: Normocephalic; atraumatic.  EYES: PERRL, 3 mm bilateral, no nystagmus, EOM intact; conjunctiva and sclera clear.  ENT: No nasal discharge; airway clear.  NECK: Supple; non tender. + full passive ROM in all directions. No JVD  CARD: S1, S2 normal; no murmurs, gallops, or rubs. Regular rate and rhythm. + Symmetric Strong Pulses  RESP: No wheezes, rales or rhonchi. Good air movement bilaterally  ABD: soft; non-distended; non-tender. No Rebound, No Guarding, No signs of peritonitis, No CVA tenderness. No pulsatile abdominal mass. + Strong and Symmetric Pulses  EXT: Normal ROM. No clubbing, cyanosis or edema. Dp and Pt Pulses intact. Cap refill less than 3 seconds  NEURO: CN 2-12 intact, normal finger to nose, normal romberg, stable gait, no sensory or motor deficits, Alert, oriented, grossly unremarkable. No Focal deficits. GCS 15. NIH 0  PSYCH: Cooperative, appropriate.

## 2022-07-03 NOTE — ED PROVIDER NOTE - OBJECTIVE STATEMENT
26 yo f ho htn pw LT lower leg and LT forearm numbness tingling and "tightness" since 12am today. Sxs began suddenly and has been constant since today. Denies weakness, cp. sob, nausea/vomiting, headache, trauma   Admits she hasn't been taking all her htn rx (amlodipine 5mg and hydrochlorothiazide 12.5mg) for the last month because changed insurance. Did take her AM labetolol 100mg in the AM  Accompanied by

## 2022-07-03 NOTE — ED CDU PROVIDER INITIAL DAY NOTE - NSICDXFAMILYHX_GEN_ALL_CORE_FT
FAMILY HISTORY:  Family history of Hashimoto thyroiditis  Family history of systemic lupus erythematosus    Father  Still living? Yes, Estimated age: Age Unknown  FH: HTN (hypertension), Age at diagnosis: Age Unknown    Mother  Still living? Yes, Estimated age: Age Unknown  FH: HTN (hypertension), Age at diagnosis: Age Unknown

## 2022-07-03 NOTE — ED PROVIDER NOTE - CLINICAL SUMMARY MEDICAL DECISION MAKING FREE TEXT BOX
27y Female with PMHx of HTN presenting with numbness in the left leg below the knee, and Lt arm below the elbow. No blurry vision, diplopia, headache, or neck pain, weakness, slurred speech, or recent head or neck trauma.   she is on antibiotic for UTI for burning sensation in urine.   she never had similar symptoms, and currently she feels better   NIHSS was not significant (0). CTH was not significant   She does not have headache, and no history of migraine, which make this diagnosis less likely. No jerky movements or LOC and the symptoms are  still present which is not in support of seizure.   In her age a small stroke is less likely, but she has PMH of HTN and would consider MR to r/o  Other issues to consider is demyelinating lesions     Plan:  - Keep in observation   - MR brain wo contrast   - BP monitor with Vital Q8H

## 2022-07-03 NOTE — CONSULT NOTE ADULT - ATTENDING COMMENTS
Pt examined 7/3/22 with  at bedside.  Patient has improving left sided numbness.  She has history of migraines and I suspect this is migraine variant.  If brain MRI negative for acute stroke may be discharged home w/o antiplatelet therapy or statin but should f/u with PCP for control of hypertension and outpatient neurologic f/u in 4 weeks.

## 2022-07-03 NOTE — ED ADULT TRIAGE NOTE - CHIEF COMPLAINT QUOTE
pt c.o left sided numbness and weakness since last night. pt was in urgi care today and was sent in due to high b.p pt also c.o left calf cramping. fs 106

## 2022-07-03 NOTE — ED CDU PROVIDER INITIAL DAY NOTE - OBJECTIVE STATEMENT
27 y.o. female with pmx of htn, bib  stating patient complaining of paresthesia left arm and left leg since this am approximately 1230am. patient denies any other complaint, no diplopia, no chest , no sob. patient states feeling better now.

## 2022-07-03 NOTE — STROKE CODE NOTE - ASSESSMENT/PLAN
Case discussed with resident.    37yoF presented with LLE numbness onset around midnight last night. Initially it was arm and leg. /101, HR 88, T98.3.     Outside window for tpa  No sensory deficit, only subjective numbness  NIHSS 0  Not a candidate for thrombectomy

## 2022-07-03 NOTE — ED CDU PROVIDER INITIAL DAY NOTE - NS ED ATTENDING STATEMENT MOD
This was a shared visit with the ANGELI. I reviewed and verified the documentation and independently performed the documented:

## 2022-07-03 NOTE — ED ADULT NURSE NOTE - BEFAST BALANCE
[address]  Progress Note - Alicia Kemp 1954, 78 y o  female MRN: [id number]  Unit/Bed#: -01 Encounter: [id number]  Primary Care Provider: Alice Chavez MD   Date and time admitted to hospital: 7/18/2021  2:07 PM    * Sepsis due to pneumonia [facility])  Assessment & Plan  · POA a/e/b - tachypnea, tachycardia, hypoxia   · Sepsis secondary to pneumonia likely aspiration vs gram-negative  · CXR 7/20 progressive b/l infiltrates R>L  · 7/20 Continue cefepime and metronidazole  Discontinue azithromycin and add Vanco given group home setting and worsening infiltrate on CXR    · 7/22 Due to low grade fevers, clinical deterioration, and positive sputum culture Cefepime changed to Meropenum  · Current ABX - Meropenum, Metronidazole, Vancomycin  · Blood cultures NG48  · Flu/RSV - negative  · Sputum culture Gram neg coccobacilli, GNR, and GPC  · COVID negative on admission and patient fully vaccinated  · See plan for aspiration below     Results from last 7 days   Lab Units 07/20/21  0521 07/18/21 2032   PROCALCITONIN ng/ml 0 08 <0 05       Acute respiratory failure with hypoxia (HCC)  Assessment & Plan  · Acute respiratory failure/hypoxia secondary to pneumonia with component of pulmonary edema  · Cont nebs and abx as abve  · CTA negative for pulmonary embolism- see plan under PNA  · Check echo  · Gave 20 mg IV Lasix with good response  · Repeat lab work concerning for contraction alkalosis - Diamox 500 mg IV x 2   · 7/20 Escalated to midflow then required transfer for HFNC under Critical Care  · 7/21 HFNC + NRB, tolerated off NRB in the evening  · 7/22 progressive hypoxia on HFNC - placed on CPAP with improvement, CXR - progressive infiltrate R middle lobe, highly suggestion of mucous plugging and aspiration  · Alternates HFNC and CPAP 12 100%  · Goal SpO2 >92%  · Trend ABG as needed  · Cont duoneb, 3% saline, chest PT, suction  · Due to cognitive delay, pt is unable to fully participate in aggressive pulmonary toilet  · Likely has undiagnosed sleep apnea    Aspiration pneumonia [facility])  Assessment & Plan  · Chest x-ray showed suspected right middle lobe consolidation  · CTA Chest:   No central pulmonary emboli identified  Significant interval worsening in airspace consolidation and patchy infiltrates  involving the right upper lobe to the greatest degree posterior segment but also elsewhere within the right upper lobe  There is also interval worsening of the consolidation and volume loss in the dependent lower lobes bilaterally  Also  component of chronic volume loss related to elevated right hemidiaphragm  Focally dilated main pulmonary artery  Assess for pulmonary valve disease  · Continue cefepime and Flagyl, add Vanco    · NPO due to mental status   · Aspiration precautions  · Sputum culture - gram neg and gram positive  · Speech therapy recommends mechanically altered/level 2 diet and nectar thick liquids  · Witnessed aspiration 7/21, made NPO with re-eval speech eval on 7/23    Abnormal CT of the chest  Assessment & Plan  · CT of chest on 7/18 revealed:  Significant dilation of the main pulmonary artery, measuring 44 mm, with prominence of the central pulmonary arteries  · ECHO - EF 60%, mild AR, MR, TR, and NM; peak PA pressure 35 mmHg    Hyperlipidemia  Assessment & Plan  · Patient takes Crestor 40 mg daily at home  · Continue Lipitor inpatient - on hold due to NPO     Pancytopenia [facility])  Assessment & Plan  · History of  Pancytopenia diagnosed in 3/2019  · Follows with Oncologist, Dr Tutu Martinez   · Continue to monitor CBC      Seizure disorder [facility])  Assessment & Plan  · Cont home Depakote  mg q am and 500 mg q pm  · Changed to IV due to NPO  · Level pending     Obesity (BMI 30 0-34  9)  Assessment & Plan  · Nutrition consulted     GERD (gastroesophageal reflux disease)  Assessment & Plan  · Continue pantoprazole    DM (diabetes mellitus), type 2 ([facility] [address] )  Assessment & Plan  Lab Results   Component Value Date    HGBA1C 6 6 (H) 2021       Recent Labs     21  0601 21  1204 21  1808 21  2313   POCGLU 173* 163* 140 177*       Blood Sugar Average: Last 72 hrs:  (P) 195 75   · Not on meds prehospital  · Continue sliding scale insulin    Psychiatric disorder  Assessment & Plan  · Congential Intellectual Disabiltiy, Anxiety, Psychosis- continue gabapentin, quetiapine, lorazepam, and sertraline  · Home regimen of seroquel is 100 mg q am and 1600 with 200 mg q hs  · Hold Seroquel due to somnolence   · Zyprexa 10 mg sublingual @ hs and 2 5 mg prn while NPO      ----------------------------------------------------------------------------------------  HPI/24hr events: Yesterday pt tolerated wean from CPAP back to HFNC during the day  Diuresed with Lasix  Overnight required total of 10 mg Zyprexa at hs for restlessness, following tolerated CPAP throughout the night  Remains on a 1:1 for safety      Disposition: Continue Stepdown Level 1 level of care   Code Status: Level 3 - DNAR and DNI  ---------------------------------------------------------------------------------------  SUBJECTIVE  N/a pt non verbal    Review of Systems  Review of systems was unable to be performed secondary to non verbal at baseline  ---------------------------------------------------------------------------------------  OBJECTIVE    Vitals   Vitals:    21 0200 21 0242 21 0300 21 0400   BP: (!) 110/49  119/70 129/60   BP Location: Right arm  Right arm Right arm   Pulse: 68  79 75   Resp: (!) 32  (!) 29 (!) 27   Temp: 99 5 °F (37 5 °C)  99 5 °F (37 5 °C) 99 5 °F (37 5 °C)   TempSrc: Bladder  Bladder Bladder   SpO2: 99% 99% 99% 99%   Weight:       Height:         Temp (24hrs), Av 6 °F (37 6 °C), Min:99 3 °F (37 4 °C), Max:99 9 °F (37 7 °C)  Current: Temperature: 99 5 °F (37 5 °C)          Respiratory:  SpO2: SpO2: 99 %, SpO2 Device: O2 Device: CPAP  Nasal Cannula O2 Flow Rate (L/min): 15 L/min    Invasive/non-invasive ventilation settings   Respiratory    Lab Data (Last 4 hours)    None         O2/Vent Data (Last 4 hours)      07/23 0242          Non-Invasive Ventilation Mode CPAP                   Physical Exam  Constitutional:       General: She is awake  She is not in acute distress  Appearance: She is well-developed  She is obese  Interventions: Nasal cannula and face mask in place  Cardiovascular:      Rate and Rhythm: Normal rate and regular rhythm  Pulses: Normal pulses  Heart sounds: No murmur heard  No friction rub  No gallop  Pulmonary:      Effort: Pulmonary effort is normal  No respiratory distress  Breath sounds: Rhonchi and rales present  No wheezing  Abdominal:      General: Bowel sounds are normal  There is no distension  Palpations: Abdomen is soft  Musculoskeletal:      Right lower leg: Edema present  Left lower leg: Edema present  Skin:     General: Skin is warm and dry  Capillary Refill: Capillary refill takes less than 2 seconds  Neurological:      General: No focal deficit present  Mental Status: She is alert  Mental status is at baseline           Laboratory and Diagnostics:  Results from last 7 days   Lab Units 07/22/21  0233 07/21/21  0556 07/20/21  2312 07/20/21  0521 07/19/21  0517 07/18/21  1612   WBC Thousand/uL 7 84 6 66  --  6 14 4 50 5 24   HEMOGLOBIN g/dL 9 7* 9 6*  --  9 1* 9 8* 10 8*   I STAT HEMOGLOBIN g/dl  --   --  9 9*  --   --   --    HEMATOCRIT % 31 1* 30 4*  --  28 7* 31 0* 33 1*   HEMATOCRIT, ISTAT %  --   --  29*  --   --   --    PLATELETS Thousands/uL 161 149  --  123* 153 163   NEUTROS PCT % 84*  --   --  79*  --  68   BANDS PCT %  --   --   --   --  26*  --    MONOS PCT % 9  --   --  9  --  12   MONO PCT %  --   --   --   --  7  --      Results from last 7 days   Lab Units 07/22/21  1805 07/22/21  0233 07/21/21  1600 07/21/21  0556 07/21/21  0106 07/20/21  2312 07/20/21  0521 07/19/21  0517 07/18/21  1612   SODIUM mmol/L 138 134* 135* 135* 135*  --  134* 133* 134*   POTASSIUM mmol/L 3 7 4 1 3 8 3 9 3 3*  --  3 8 4 1 4 5   CHLORIDE mmol/L 103 104 102 99* 99*  --  98* 97* 95*   CO2 mmol/L 29 27 27 31 34*  --  32 31 35*   CO2, I-STAT mmol/L  --   --   --   --   --  37*  --   --   --    ANION GAP mmol/L 6 3* 6 5 2*  --  4 5 4   BUN mg/dL 20 15 15 12 12  --  15 12 13   CREATININE mg/dL 0 59* 0 59* 0 58* 0 58* 0 56*  --  0 62 0 67 0 71   CALCIUM mg/dL 9 8 9 0 9 5 9 3 9 4  --  9 2 9 5 10 2*   GLUCOSE RANDOM mg/dL 140 167* 155* 216* 263*  --  183* 174* 140   ALT U/L  --   --   --  54  --   --   --   --  20   AST U/L  --   --   --  49*  --   --   --   --  25   ALK PHOS U/L  --   --   --  64  --   --   --   --  67   ALBUMIN g/dL  --   --   --  2 2*  --   --   --   --  3 1*   TOTAL BILIRUBIN mg/dL  --   --   --  0 39  --   --   --   --  0 54     Results from last 7 days   Lab Units 07/22/21  0233 07/21/21  1600 07/21/21  0556   MAGNESIUM mg/dL 1 9 2 0 1 9   PHOSPHORUS mg/dL  --   --  3 0           Results from last 7 days   Lab Units 07/18/21  1612   TROPONIN I ng/mL <0 02     Results from last 7 days   Lab Units 07/18/21 2039 07/18/21  1612   LACTIC ACID mmol/L 1 9 1 2     ABG:  Results from last 7 days   Lab Units 07/22/21  0346   PH ART  7 371   PCO2 ART mm Hg 47 1*   PO2 ART mm Hg 95 0   HCO3 ART mmol/L 26 7   BASE EXC ART mmol/L 1 1   ABG SOURCE  Radial, Left     VBG:  Results from last 7 days   Lab Units 07/22/21  0346   ABG SOURCE  Radial, Left     Results from last 7 days   Lab Units 07/22/21  0233 07/21/21  0556 07/20/21  0521 07/18/21 2032   PROCALCITONIN ng/ml 0 08 0 14 0 08 <0 05       Micro  Results from last 7 days   Lab Units 07/21/21  1055 07/19/21  0519 07/18/21  2040 07/18/21 2039   BLOOD CULTURE   --   --  No Growth at 72 hrs  No Growth at 72 hrs     SPUTUM CULTURE   --  4+ Growth of   --   --    GRAM STAIN RESULT   --  4+ Gram negative coccobacilli*  2+ Gram negative rods*  2+ Gram positive cocci in pairs*  2+ Polys*  --   --    MRSA CULTURE ONLY  No Methicillin Resistant Staphlyococcus aureus (MRSA) isolated  --   --   --        EKG: NSR HR 70's  Imaging: I have personally reviewed pertinent reports  and I have personally reviewed pertinent films in PACS    Intake and Output  I/O       07/21 0701 - 07/22 0700 07/22 0701 - 07/23 0700    P  O  60     I V  (mL/kg) 40 (0 5) 250 (3 1)    IV Piggyback 550 950    Total Intake(mL/kg) 650 (8) 1200 (14 7)    Urine (mL/kg/hr) 2620 (1 3) 2480 (1 3)    Stool 0     Total Output 2620 2480    Net -1970 -1280          Unmeasured Stool Occurrence 1 x           Height and Weights   Height: 5' (152 4 cm)  IBW (Ideal Body Weight): 45 5 kg  Body mass index is 35 13 kg/m²  Weight (last 2 days)     Date/Time   Weight    07/22/21 0600   81 6 (179 9)    07/21/21 0547   80 4 (177 25)                Nutrition       Diet Orders   (From admission, onward)             Start     Ordered    07/22/21 0229  Diet NPO  Diet effective now     Comments: Mechanical soft/level 2, Nectar thick liquids, crush pills if able   Question Answer Comment   Diet Type NPO    RD to adjust diet per protocol?  Yes        07/22/21 0228                  Active Medications  Scheduled Meds:  Current Facility-Administered Medications   Medication Dose Route Frequency Provider Last Rate    acetaminophen  650 mg Rectal Q4H PRN KRISTOPHER Greenwood      bisacodyl  10 mg Rectal Daily PRN KRISTOPHER Moreno      enoxaparin  40 mg Subcutaneous Q24H [facility] Bettina S Keven, KRISTOPHER      insulin lispro  2-12 Units Subcutaneous Q6H [facility] KRISTOPHER Moreno      ipratropium-albuterol  3 mL Nebulization Q6H Bettina S Keven, KRISTOPHER      meropenem  1,000 mg Intravenous Q8H KRISTOPHER Greenwood Stopped (07/23/21 0210)    metroNIDAZOLE  500 mg Intravenous Q8H Bettina S Keven, KRISTOPHER Stopped (07/23/21 0359)    OLANZapine  10 mg Oral HS KRISTOPHER Cason      OLANZapine  2 5 mg Oral Daily PRN Berto Parikh PA-C      oxymetazoline  1 spray Each Nare Q12H [address] KRISTOPHER Moreno      pantoprazole  40 mg Intravenous Q24H [address] KRISTOPHER Cason      sodium chloride  1 spray Each Nare Q1H PRN KRISTOPHER Moreno      sodium chloride  4 mL Nebulization Q6H KRISTOPHER Cason      valproate sodium  250 mg Intravenous [facility] KRISTOPHER Florentino 250 mg (07/23/21 0532)    vancomycin  1,500 mg Intravenous Q12H Azuka Onye, DO Stopped (07/23/21 0250)     Continuous Infusions:     PRN Meds:   acetaminophen, 650 mg, Q4H PRN  bisacodyl, 10 mg, Daily PRN  OLANZapine, 2 5 mg, Daily PRN  sodium chloride, 1 spray, Q1H PRN        Invasive Devices Review  Invasive Devices     Peripheral Intravenous Line            Peripheral IV 07/21/21 Left Forearm 2 days    Peripheral IV 07/21/21 Right Forearm 2 days          Drain            Urethral Catheter Temperature probe;Non-latex 16 Fr  2 days                Rationale for remaining devices: nicole - accurate I/O  ---------------------------------------------------------------------------------------  Advance Directive and Living Will:      Power of :    POLST:    ---------------------------------------------------------------------------------------  Care Time Delivered:   Upon my evaluation, this patient had a high probability of imminent or life-threatening deterioration due to acute hypoxic respiratory failure, which required my direct attention, intervention, and personal management  I have personally provided 32 minutes (0:50 to 2:30) of critical care time, exclusive of procedures, teaching, family meetings, and any prior time recorded by providers other than myself  KRISTOPHER Macias      Portions of the record may have been created with voice recognition software  Occasional wrong word or "sound a like" substitutions may have occurred due to the inherent limitations of voice recognition software  No Read the chart carefully and recognize, using context, where substitutions have occurred

## 2022-07-03 NOTE — ED CDU PROVIDER INITIAL DAY NOTE - MEDICAL DECISION MAKING DETAILS
I personally evaluated the patient. I reviewed the Resident’s or Physician Assistant’s note (as assigned above), and agree with the findings and plan except as documented in my note. I assumed care of this patient on 7/4/22 at 7 am. Pt evaluated for numbness of LUE and LLE. Pt seen in the ED and placed in EDOU for further evaluation. Plan to obtain MR today.

## 2022-07-04 VITALS
DIASTOLIC BLOOD PRESSURE: 96 MMHG | TEMPERATURE: 99 F | SYSTOLIC BLOOD PRESSURE: 144 MMHG | OXYGEN SATURATION: 99 % | HEART RATE: 81 BPM | RESPIRATION RATE: 16 BRPM

## 2022-07-04 PROCEDURE — 99217: CPT

## 2022-07-04 PROCEDURE — 70551 MRI BRAIN STEM W/O DYE: CPT | Mod: 26,MG

## 2022-07-04 PROCEDURE — G1004: CPT

## 2022-07-04 NOTE — ED CDU PROVIDER DISPOSITION NOTE - NSFOLLOWUPCLINICS_GEN_ALL_ED_FT
[facility]  Neurology  [address] [facility] [address]  [place] [address]  Phone: [phone]  Fax:   Follow Up Time: Routine    [facility]  Medicine  [address], [place]   Phone: [phone]  Fax:   Follow Up Time: Routine

## 2022-07-04 NOTE — ED CDU PROVIDER SUBSEQUENT DAY NOTE - MEDICAL DECISION MAKING DETAILS
I personally evaluated the patient. I reviewed the Resident’s or Physician Assistant’s note (as assigned above), and agree with the findings and plan except as documented in my note. Patient evaluated for numbness. Seen in ED and evaluated, placed in EDOU for further evaluation and treatment. Pt obtained MRI. Pending read and neuro clearance.

## 2022-07-04 NOTE — ED CDU PROVIDER DISPOSITION NOTE - CLINICAL COURSE
I personally evaluated the patient. I reviewed the Resident’s or Physician Assistant’s note (as assigned above), and agree with the findings and plan except as documented in my note. Patient evaluated for numbness. Evaluated in the ED and placed in EDOU for further evaluation. MR obtained, negative for intracranial pathology. Cleared by neuro. I have fully discussed the medical management and delivery of care with the patient. I have discussed any available labs, imaging and treatment options with the patient. Patient confirms understanding and has been given detailed return precautions. Patient instructed to return to the ED should symptoms persist or worsen. Patient has demonstrated capacity and has verbalized understanding. Patient is well appearing upon discharge.

## 2022-07-04 NOTE — ED CDU PROVIDER SUBSEQUENT DAY NOTE - ATTENDING APP SHARED VISIT CONTRIBUTION OF CARE
28 yo F pmh htn pw numbness. Pt endorsing numbness over the L arm and leg around 12:30 am on day of arrival. Pt denies other complaints. Seen in the ED and placed in EDOU. No cp, no sob, no abd pain, no ha, no weakness, no facial droop, no speech changes, no n/v/d.     CONSTITUTIONAL: Well-developed; well-nourished; in no acute distress. Sitting up and providing appropriate history and physical examination  SKIN: skin exam is warm and dry, no acute rash.  HEAD: Normocephalic; atraumatic.  EYES: PERRL, 3 mm bilateral, no nystagmus, EOM intact; conjunctiva and sclera clear.  ENT: No nasal discharge; airway clear.  NECK: Supple; non tender. + full passive ROM in all directions. No JVD  CARD: S1, S2 normal; no murmurs, gallops, or rubs. Regular rate and rhythm. + Symmetric Strong Pulses  RESP: No wheezes, rales or rhonchi. Good air movement bilaterally  ABD: soft; non-distended; non-tender. No Rebound, No Guarding, No signs of peritonitis, No CVA tenderness. No pulsatile abdominal mass. + Strong and Symmetric Pulses  EXT: Normal ROM. No clubbing, cyanosis or edema. Dp and Pt Pulses intact. Cap refill less than 3 seconds  NEURO: CN 2-12 intact, normal finger to nose, normal romberg, stable gait, no sensory or motor deficits, Alert, oriented, grossly unremarkable. No Focal deficits. GCS 15. NIH 0  PSYCH: Cooperative, appropriate.

## 2022-07-04 NOTE — ED CDU PROVIDER DISPOSITION NOTE - PATIENT PORTAL LINK FT
You can access the FollowMyHealth Patient Portal offered by [facility] by registering at the following website: [email or web address]. By joining [facility]’s FollowMyHealth portal, you will also be able to view your health information using other applications (apps) compatible with our system.

## 2022-08-23 NOTE — ED ADULT TRIAGE NOTE - BEFAST ARM NUMBNESS
Chief Complaint   Patient presents with   • Neck Pain   • Back Pain   • Office Visit       Date of Injury: 22  Initial Treatment Date: 22  Date Last Seen:22  Mechanism of Onset: after sanding coffee table  Occupation: None reported or answered  Primary Care Physician: Kamilah Hampton MD  Date informed consent signed:  2021 at 2 pm  Sonia  reports that she quit smoking about 13 years ago. Her smoking use included cigarettes. She started smoking about 53 years ago. She has a 0.15 pack-year smoking history. She has never used smokeless tobacco.  Sonia is allergic to allergy, cat dander, codeine, adhesive   (environmental), and gallus gallus feather (chicken) allergy skin test.  Denies allergy to latex or sensitivity to latex.  Advance Directive Status:none in chart   Visit Number of Current Episode: 5  Discharged from care: No  Initial pain ratin-2/10 but flares up to 4/10  22-Initial DoD/VA Pain Supplemental Questionnaire score was 2/40.    5 %   (For use with neck and /or back problems)  ?  ?  Medicare ABN signed for electric stimulation,ultrasound,light therapy,therapeutic exercise from 22 through 23. Copy sent to scanning.    Vitals were not done at this visit.  No changes in patient's medical or social history since their last visit.    SUBJECTIVE:  Sonia is a pleasant 69 year old female that presents to our office today for a follow up evaluation and treatment of right neck and right upper back pain.She rates her pain today at 4/10 with 10 being the worst. Sonia felt better after her last treatment. Pain has been flaring up for the last week,so she knows that it is time for another adjustment. It is difficult doing the weeding, but she just pushes through the pain. To reduce the pain is taking ASA.           COVID-19 Screening:    The following covid question was answered no at the entrance today:    · Do you have a fever, cough, chills, vomiting, diarrhea, headache, or runny  nose?         Yes

## 2022-12-19 NOTE — ED PROVIDER NOTE - CADM POA URETHRAL CATHETER
Show Applicator Variable?: Yes
Duration Of Freeze Thaw-Cycle (Seconds): 0
Detail Level: Detailed
Consent: The patient's consent was obtained including but not limited to risks of crusting, scabbing, blistering, scarring, darker or lighter pigmentary change, recurrence, incomplete removal and infection.
Post-Care Instructions: I reviewed with the patient in detail post-care instructions. Patient is to wear sunprotection, and avoid picking at any of the treated lesions. Pt may apply Vaseline to crusted or scabbing areas.
Render Post-Care Instructions In Note?: no
Number Of Freeze-Thaw Cycles: 2 freeze-thaw cycles
Medical Necessity Information: It is in your best interest to select a reason for this procedure from the list below. All of these items fulfill various CMS LCD requirements except the new and changing color options.
Medical Necessity Clause: This procedure was medically necessary because the lesions that were treated were:
Spray Paint Text: The liquid nitrogen was applied to the skin utilizing a spray paint frosting technique.
No

## 2023-01-18 NOTE — ED CDU PROVIDER INITIAL DAY NOTE - CROS ED ALLERGIC IMMUN ALL NEG
Prior Authorization Retail Medication Request    Medication/Dose: dupilumab (DUPIXENT) 300 MG/2ML prefilled pen  ICD code (if different than what is on RX):    Previously Tried and Failed:    Rationale:      Insurance Name:  PENDING OTHER INSURANCE   Insurance ID:        Pharmacy Information (if different than what is on RX)  Name:  [facility]   Phone:  [phone]       negative...

## 2023-02-13 PROBLEM — I10 ESSENTIAL (PRIMARY) HYPERTENSION: Chronic | Status: ACTIVE | Noted: 2022-07-03

## 2023-02-24 ENCOUNTER — ASOB RESULT (OUTPATIENT)
Age: 28
End: 2023-02-24

## 2023-02-24 ENCOUNTER — APPOINTMENT (OUTPATIENT)
Dept: ANTEPARTUM | Facility: CLINIC | Age: 28
End: 2023-02-24
Payer: COMMERCIAL

## 2023-02-24 PROCEDURE — 36415 COLL VENOUS BLD VENIPUNCTURE: CPT

## 2023-02-24 PROCEDURE — 93976 VASCULAR STUDY: CPT | Mod: 59

## 2023-02-24 PROCEDURE — 76813 OB US NUCHAL MEAS 1 GEST: CPT

## 2023-02-24 PROCEDURE — 76817 TRANSVAGINAL US OBSTETRIC: CPT

## 2023-03-24 ENCOUNTER — APPOINTMENT (OUTPATIENT)
Dept: ANTEPARTUM | Facility: CLINIC | Age: 28
End: 2023-03-24
Payer: COMMERCIAL

## 2023-03-24 ENCOUNTER — TRANSCRIPTION ENCOUNTER (OUTPATIENT)
Age: 28
End: 2023-03-24

## 2023-03-24 ENCOUNTER — ASOB RESULT (OUTPATIENT)
Age: 28
End: 2023-03-24

## 2023-03-24 PROCEDURE — 76805 OB US >/= 14 WKS SNGL FETUS: CPT

## 2023-04-04 NOTE — PROGRESS NOTE ADULT - ATTENDING COMMENTS
[facility] PROFESSIONAL SERVICES  [facility]   [address]   [address] [address]   Dept: [phone]   Dept Fax: [phone]: [phone]      Chief Complaint   Patient presents with    Follow-Up from Hospital    Check-Up    Coronary Artery Disease     Cardiologist:  Dr. Elian Booker  58 yo male presents for hfu for CABG with Dr Wilton Daniel. Hx of prior PCI LAD, RCA, HTN, HLD, DM. 90% left main stenosis. No chest pain, breathing has been okay. No swelling. occ dizziness, bending over and moving too quickly. Saw CTS couple weeks ago, stopped amio, lasix, Kcl. Doing well otherwise, no issues with his sternal incision. Not doing rehab. General:   No fever, no chills, no weight loss, no fatigue  Pulmonary:    No dyspnea, no wheezing  Cardiac:    Denies recent chest pain   GI:     No nausea or vomiting, no abdominal pain  Neuro:     No dizziness or light headedness  Musculoskeletal:  No recent active issues  Extremities:   No edema      Past Medical History:   Diagnosis Date    CAD (coronary artery disease)     Hyperlipidemia     S/P PTCA (percutaneous transluminal coronary angioplasty) 1/17/2013 01/17/2013: FFR-guided stenting of the mid-LAD using TRISHA, Xience 3.0 X 38 mm, post-dilated to 3.78 mm.  Dr. Pablo Muhammad  7/21/2011: Stenting of the mid-RCA using TRISHA, Ion 2.75 X 28 mm, post-dilated to 3 mm Dr. Pablo Muhammad  10/19/2012: Cardiac cath showed patent RCA stent, 50% LAD lesion, and 60% LCx lesion Dr. Elian Booker     Type II or unspecified type diabetes mellitus without mention of complication, not stated as uncontrolled        No Known Allergies    Current Outpatient Medications   Medication Sig Dispense Refill    empagliflozin (JARDIANCE) 25 MG tablet Take 1 tablet by mouth daily 30 tablet 3    metoprolol tartrate (LOPRESSOR) 25 MG tablet Take 1 tablet by mouth 2 times daily 60 tablet 3    nitroGLYCERIN (NITROSTAT) 0.4 MG SL tablet Place 1 tablet under the tongue every 5 minutes as needed for Patient examined earlier this evening and had normal neurologic exam.  SBP were still in 190's however patients numbness had resolved.  Brain imaging with MRI/MRA was normal.     Recommend continued efforts at controlling BP.  Reconsult as needed.

## 2023-04-28 ENCOUNTER — ASOB RESULT (OUTPATIENT)
Age: 28
End: 2023-04-28

## 2023-04-28 ENCOUNTER — APPOINTMENT (OUTPATIENT)
Dept: ANTEPARTUM | Facility: CLINIC | Age: 28
End: 2023-04-28
Payer: COMMERCIAL

## 2023-04-28 PROCEDURE — 76811 OB US DETAILED SNGL FETUS: CPT

## 2023-05-05 ENCOUNTER — ASOB RESULT (OUTPATIENT)
Age: 28
End: 2023-05-05

## 2023-05-05 ENCOUNTER — APPOINTMENT (OUTPATIENT)
Dept: ANTEPARTUM | Facility: CLINIC | Age: 28
End: 2023-05-05
Payer: COMMERCIAL

## 2023-05-05 PROCEDURE — 76816 OB US FOLLOW-UP PER FETUS: CPT

## 2023-05-11 ENCOUNTER — APPOINTMENT (OUTPATIENT)
Dept: ANTEPARTUM | Facility: CLINIC | Age: 28
End: 2023-05-11
Payer: COMMERCIAL

## 2023-05-11 ENCOUNTER — ASOB RESULT (OUTPATIENT)
Age: 28
End: 2023-05-11

## 2023-05-11 PROCEDURE — 76816 OB US FOLLOW-UP PER FETUS: CPT

## 2023-06-08 ENCOUNTER — APPOINTMENT (OUTPATIENT)
Dept: ANTEPARTUM | Facility: CLINIC | Age: 28
End: 2023-06-08
Payer: COMMERCIAL

## 2023-06-08 ENCOUNTER — ASOB RESULT (OUTPATIENT)
Age: 28
End: 2023-06-08

## 2023-06-08 PROCEDURE — 76817 TRANSVAGINAL US OBSTETRIC: CPT

## 2023-06-08 PROCEDURE — 76816 OB US FOLLOW-UP PER FETUS: CPT

## 2023-06-22 ENCOUNTER — ASOB RESULT (OUTPATIENT)
Age: 28
End: 2023-06-22

## 2023-06-22 ENCOUNTER — APPOINTMENT (OUTPATIENT)
Dept: ANTEPARTUM | Facility: CLINIC | Age: 28
End: 2023-06-22
Payer: COMMERCIAL

## 2023-06-22 PROCEDURE — 76819 FETAL BIOPHYS PROFIL W/O NST: CPT

## 2023-06-22 PROCEDURE — 76816 OB US FOLLOW-UP PER FETUS: CPT

## 2023-06-30 ENCOUNTER — INPATIENT (INPATIENT)
Facility: HOSPITAL | Age: 28
LOS: 0 days | Discharge: HOME | DRG: 833 | End: 2023-07-01
Attending: OBSTETRICS & GYNECOLOGY | Admitting: OBSTETRICS & GYNECOLOGY
Payer: COMMERCIAL

## 2023-06-30 VITALS
OXYGEN SATURATION: 99 % | WEIGHT: 171.96 LBS | TEMPERATURE: 98 F | HEART RATE: 85 BPM | HEIGHT: 64 IN | SYSTOLIC BLOOD PRESSURE: 149 MMHG | RESPIRATION RATE: 18 BRPM | DIASTOLIC BLOOD PRESSURE: 97 MMHG

## 2023-06-30 DIAGNOSIS — O26.899 OTHER SPECIFIED PREGNANCY RELATED CONDITIONS, UNSPECIFIED TRIMESTER: ICD-10-CM

## 2023-06-30 DIAGNOSIS — Z3A.00 WEEKS OF GESTATION OF PREGNANCY NOT SPECIFIED: ICD-10-CM

## 2023-06-30 DIAGNOSIS — Z98.890 OTHER SPECIFIED POSTPROCEDURAL STATES: Chronic | ICD-10-CM

## 2023-06-30 LAB
ALBUMIN SERPL ELPH-MCNC: 3.6 G/DL — SIGNIFICANT CHANGE UP (ref 3.3–5)
ALP SERPL-CCNC: 83 U/L — SIGNIFICANT CHANGE UP (ref 40–120)
ALT FLD-CCNC: 23 U/L — SIGNIFICANT CHANGE UP (ref 10–45)
ANION GAP SERPL CALC-SCNC: 11 MMOL/L — SIGNIFICANT CHANGE UP (ref 5–17)
APTT BLD: 28.5 SEC — SIGNIFICANT CHANGE UP (ref 27.5–35.5)
AST SERPL-CCNC: 19 U/L — SIGNIFICANT CHANGE UP (ref 10–40)
BASOPHILS # BLD AUTO: 0.05 K/UL — SIGNIFICANT CHANGE UP (ref 0–0.2)
BASOPHILS NFR BLD AUTO: 0.4 % — SIGNIFICANT CHANGE UP (ref 0–2)
BILIRUB SERPL-MCNC: 0.2 MG/DL — SIGNIFICANT CHANGE UP (ref 0.2–1.2)
BLD GP AB SCN SERPL QL: NEGATIVE — SIGNIFICANT CHANGE UP
BUN SERPL-MCNC: 13 MG/DL — SIGNIFICANT CHANGE UP (ref 7–23)
CALCIUM SERPL-MCNC: 9.5 MG/DL — SIGNIFICANT CHANGE UP (ref 8.4–10.5)
CHLORIDE SERPL-SCNC: 103 MMOL/L — SIGNIFICANT CHANGE UP (ref 96–108)
CO2 SERPL-SCNC: 20 MMOL/L — LOW (ref 22–31)
CREAT ?TM UR-MCNC: 25 MG/DL — SIGNIFICANT CHANGE UP
CREAT SERPL-MCNC: 0.58 MG/DL — SIGNIFICANT CHANGE UP (ref 0.5–1.3)
EGFR: 126 ML/MIN/1.73M2 — SIGNIFICANT CHANGE UP
EOSINOPHIL # BLD AUTO: 0.05 K/UL — SIGNIFICANT CHANGE UP (ref 0–0.5)
EOSINOPHIL NFR BLD AUTO: 0.4 % — SIGNIFICANT CHANGE UP (ref 0–6)
FIBRINOGEN PPP-MCNC: 635 MG/DL — HIGH (ref 200–445)
GLUCOSE SERPL-MCNC: 88 MG/DL — SIGNIFICANT CHANGE UP (ref 70–99)
HCT VFR BLD CALC: 36.3 % — SIGNIFICANT CHANGE UP (ref 34.5–45)
HGB BLD-MCNC: 12.3 G/DL — SIGNIFICANT CHANGE UP (ref 11.5–15.5)
IMM GRANULOCYTES NFR BLD AUTO: 0.4 % — SIGNIFICANT CHANGE UP (ref 0–0.9)
INR BLD: 0.92 — SIGNIFICANT CHANGE UP (ref 0.88–1.16)
LDH SERPL L TO P-CCNC: 157 U/L — SIGNIFICANT CHANGE UP (ref 50–242)
LYMPHOCYTES # BLD AUTO: 15.9 % — SIGNIFICANT CHANGE UP (ref 13–44)
LYMPHOCYTES # BLD AUTO: 2.14 K/UL — SIGNIFICANT CHANGE UP (ref 1–3.3)
MAGNESIUM SERPL-MCNC: 5 MG/DL — HIGH (ref 1.6–2.6)
MCHC RBC-ENTMCNC: 30.8 PG — SIGNIFICANT CHANGE UP (ref 27–34)
MCHC RBC-ENTMCNC: 33.9 GM/DL — SIGNIFICANT CHANGE UP (ref 32–36)
MCV RBC AUTO: 90.8 FL — SIGNIFICANT CHANGE UP (ref 80–100)
MONOCYTES # BLD AUTO: 0.94 K/UL — HIGH (ref 0–0.9)
MONOCYTES NFR BLD AUTO: 7 % — SIGNIFICANT CHANGE UP (ref 2–14)
NEUTROPHILS # BLD AUTO: 10.18 K/UL — HIGH (ref 1.8–7.4)
NEUTROPHILS NFR BLD AUTO: 75.9 % — SIGNIFICANT CHANGE UP (ref 43–77)
NRBC # BLD: 0 /100 WBCS — SIGNIFICANT CHANGE UP (ref 0–0)
PLATELET # BLD AUTO: 321 K/UL — SIGNIFICANT CHANGE UP (ref 150–400)
POTASSIUM SERPL-MCNC: 4 MMOL/L — SIGNIFICANT CHANGE UP (ref 3.5–5.3)
POTASSIUM SERPL-SCNC: 4 MMOL/L — SIGNIFICANT CHANGE UP (ref 3.5–5.3)
PROT ?TM UR-MCNC: 7 MG/DL — SIGNIFICANT CHANGE UP (ref 0–12)
PROT SERPL-MCNC: 7.1 G/DL — SIGNIFICANT CHANGE UP (ref 6–8.3)
PROT/CREAT UR-RTO: 0.3 RATIO — HIGH (ref 0–0.2)
PROTHROM AB SERPL-ACNC: 10.9 SEC — SIGNIFICANT CHANGE UP (ref 10.5–13.4)
RBC # BLD: 4 M/UL — SIGNIFICANT CHANGE UP (ref 3.8–5.2)
RBC # FLD: 12.8 % — SIGNIFICANT CHANGE UP (ref 10.3–14.5)
RH IG SCN BLD-IMP: POSITIVE — SIGNIFICANT CHANGE UP
SODIUM SERPL-SCNC: 134 MMOL/L — LOW (ref 135–145)
URATE SERPL-MCNC: 4.8 MG/DL — SIGNIFICANT CHANGE UP (ref 2.5–7)
WBC # BLD: 13.42 K/UL — HIGH (ref 3.8–10.5)
WBC # FLD AUTO: 13.42 K/UL — HIGH (ref 3.8–10.5)

## 2023-06-30 RX ORDER — MAGNESIUM SULFATE 500 MG/ML
4 VIAL (ML) INJECTION ONCE
Refills: 0 | Status: COMPLETED | OUTPATIENT
Start: 2023-06-30 | End: 2023-06-30

## 2023-06-30 RX ORDER — MAGNESIUM SULFATE 500 MG/ML
2 VIAL (ML) INJECTION ONCE
Qty: 40 | Refills: 0 | Status: COMPLETED | OUTPATIENT
Start: 2023-06-30 | End: 2023-06-30

## 2023-06-30 RX ORDER — LABETALOL HCL 100 MG
300 TABLET ORAL EVERY 8 HOURS
Refills: 0 | Status: DISCONTINUED | OUTPATIENT
Start: 2023-06-30 | End: 2023-06-30

## 2023-06-30 RX ORDER — MAGNESIUM SULFATE 500 MG/ML
4 VIAL (ML) INJECTION ONCE
Refills: 0 | Status: DISCONTINUED | OUTPATIENT
Start: 2023-06-30 | End: 2023-07-01

## 2023-06-30 RX ORDER — LABETALOL HCL 100 MG
300 TABLET ORAL EVERY 8 HOURS
Refills: 0 | Status: DISCONTINUED | OUTPATIENT
Start: 2023-06-30 | End: 2023-07-01

## 2023-06-30 RX ORDER — ASPIRIN/CALCIUM CARB/MAGNESIUM 324 MG
162 TABLET ORAL EVERY 24 HOURS
Refills: 0 | Status: DISCONTINUED | OUTPATIENT
Start: 2023-06-30 | End: 2023-07-01

## 2023-06-30 RX ORDER — SODIUM CHLORIDE 9 MG/ML
1000 INJECTION, SOLUTION INTRAVENOUS
Refills: 0 | Status: DISCONTINUED | OUTPATIENT
Start: 2023-06-30 | End: 2023-07-01

## 2023-06-30 RX ORDER — MAGNESIUM SULFATE 500 MG/ML
0.5 VIAL (ML) INJECTION
Qty: 40 | Refills: 0 | Status: DISCONTINUED | OUTPATIENT
Start: 2023-06-30 | End: 2023-07-01

## 2023-06-30 RX ORDER — NIFEDIPINE 30 MG
30 TABLET, EXTENDED RELEASE 24 HR ORAL EVERY 24 HOURS
Refills: 0 | Status: DISCONTINUED | OUTPATIENT
Start: 2023-07-01 | End: 2023-07-01

## 2023-06-30 RX ADMIN — Medication 1 TABLET(S): at 22:40

## 2023-06-30 RX ADMIN — Medication 12 MILLIGRAM(S): at 14:14

## 2023-06-30 RX ADMIN — Medication 300 MILLIGRAM(S): at 23:35

## 2023-06-30 RX ADMIN — Medication 300 MILLIGRAM(S): at 15:05

## 2023-06-30 RX ADMIN — Medication 50 GM/HR: at 23:36

## 2023-06-30 RX ADMIN — Medication 300 GRAM(S): at 13:51

## 2023-06-30 RX ADMIN — SODIUM CHLORIDE 75 MILLILITER(S): 9 INJECTION, SOLUTION INTRAVENOUS at 23:36

## 2023-06-30 RX ADMIN — Medication 50 GM/HR: at 14:14

## 2023-06-30 RX ADMIN — Medication 162 MILLIGRAM(S): at 22:40

## 2023-06-30 NOTE — PATIENT PROFILE OB - FALL HARM RISK - UNIVERSAL INTERVENTIONS
Bed in lowest position, wheels locked, appropriate side rails in place/Call bell, personal items and telephone in reach/Instruct patient to call for assistance before getting out of bed or chair/Non-slip footwear when patient is out of bed/[place] to call system/Physically safe environment - no spills, clutter or unnecessary equipment/Purposeful Proactive Rounding/Room/bathroom lighting operational, light cord in reach Bed in lowest position, wheels locked, appropriate side rails in place/Call bell, personal items and telephone in reach/Instruct patient to call for assistance before getting out of bed or chair/Non-slip footwear when patient is out of bed/[place] to call system/Physically safe environment - no spills, clutter or unnecessary equipment/Purposeful Proactive Rounding/Room/bathroom lighting operational, light cord in reach Bed in lowest position, wheels locked, appropriate side rails in place/Call bell, personal items and telephone in reach/Instruct patient to call for assistance before getting out of bed or chair/Non-slip footwear when patient is out of bed/[place] to call system/Physically safe environment - no spills, clutter or unnecessary equipment/Purposeful Proactive Rounding/Room/bathroom lighting operational, light cord in reach

## 2023-07-01 ENCOUNTER — TRANSCRIPTION ENCOUNTER (OUTPATIENT)
Age: 28
End: 2023-07-01

## 2023-07-01 VITALS
SYSTOLIC BLOOD PRESSURE: 132 MMHG | TEMPERATURE: 98 F | OXYGEN SATURATION: 97 % | RESPIRATION RATE: 18 BRPM | HEART RATE: 89 BPM | DIASTOLIC BLOOD PRESSURE: 80 MMHG

## 2023-07-01 LAB
ALBUMIN SERPL ELPH-MCNC: 3.4 G/DL — SIGNIFICANT CHANGE UP (ref 3.3–5)
ALP SERPL-CCNC: 83 U/L — SIGNIFICANT CHANGE UP (ref 40–120)
ALT FLD-CCNC: 24 U/L — SIGNIFICANT CHANGE UP (ref 10–45)
ANION GAP SERPL CALC-SCNC: 4 MMOL/L — LOW (ref 5–17)
AST SERPL-CCNC: 17 U/L — SIGNIFICANT CHANGE UP (ref 10–40)
BASOPHILS # BLD AUTO: 0.03 K/UL — SIGNIFICANT CHANGE UP (ref 0–0.2)
BASOPHILS NFR BLD AUTO: 0.2 % — SIGNIFICANT CHANGE UP (ref 0–2)
BILIRUB SERPL-MCNC: <0.2 MG/DL — SIGNIFICANT CHANGE UP (ref 0.2–1.2)
BUN SERPL-MCNC: 13 MG/DL — SIGNIFICANT CHANGE UP (ref 7–23)
CALCIUM SERPL-MCNC: 8.1 MG/DL — LOW (ref 8.4–10.5)
CHLORIDE SERPL-SCNC: 105 MMOL/L — SIGNIFICANT CHANGE UP (ref 96–108)
CO2 SERPL-SCNC: 22 MMOL/L — SIGNIFICANT CHANGE UP (ref 22–31)
COLLECT DURATION TIME UR: 24 HR — SIGNIFICANT CHANGE UP
CREAT SERPL-MCNC: 0.61 MG/DL — SIGNIFICANT CHANGE UP (ref 0.5–1.3)
EGFR: 124 ML/MIN/1.73M2 — SIGNIFICANT CHANGE UP
EOSINOPHIL # BLD AUTO: 0 K/UL — SIGNIFICANT CHANGE UP (ref 0–0.5)
EOSINOPHIL NFR BLD AUTO: 0 % — SIGNIFICANT CHANGE UP (ref 0–6)
GLUCOSE SERPL-MCNC: 141 MG/DL — HIGH (ref 70–99)
HCT VFR BLD CALC: 36.7 % — SIGNIFICANT CHANGE UP (ref 34.5–45)
HGB BLD-MCNC: 12.6 G/DL — SIGNIFICANT CHANGE UP (ref 11.5–15.5)
IMM GRANULOCYTES NFR BLD AUTO: 0.7 % — SIGNIFICANT CHANGE UP (ref 0–0.9)
LYMPHOCYTES # BLD AUTO: 1.24 K/UL — SIGNIFICANT CHANGE UP (ref 1–3.3)
LYMPHOCYTES # BLD AUTO: 6.8 % — LOW (ref 13–44)
MAGNESIUM SERPL-MCNC: 6.3 MG/DL — HIGH (ref 1.6–2.6)
MAGNESIUM SERPL-MCNC: 6.6 MG/DL — HIGH (ref 1.6–2.6)
MCHC RBC-ENTMCNC: 31 PG — SIGNIFICANT CHANGE UP (ref 27–34)
MCHC RBC-ENTMCNC: 34.3 GM/DL — SIGNIFICANT CHANGE UP (ref 32–36)
MCV RBC AUTO: 90.4 FL — SIGNIFICANT CHANGE UP (ref 80–100)
MONOCYTES # BLD AUTO: 0.33 K/UL — SIGNIFICANT CHANGE UP (ref 0–0.9)
MONOCYTES NFR BLD AUTO: 1.8 % — LOW (ref 2–14)
NEUTROPHILS # BLD AUTO: 16.38 K/UL — HIGH (ref 1.8–7.4)
NEUTROPHILS NFR BLD AUTO: 90.5 % — HIGH (ref 43–77)
NRBC # BLD: 0 /100 WBCS — SIGNIFICANT CHANGE UP (ref 0–0)
PLATELET # BLD AUTO: 331 K/UL — SIGNIFICANT CHANGE UP (ref 150–400)
POTASSIUM SERPL-MCNC: 3.9 MMOL/L — SIGNIFICANT CHANGE UP (ref 3.5–5.3)
POTASSIUM SERPL-SCNC: 3.9 MMOL/L — SIGNIFICANT CHANGE UP (ref 3.5–5.3)
PROT 24H UR-MRATE: 456 MG/24 H — HIGH (ref 50–100)
PROT SERPL-MCNC: 6.7 G/DL — SIGNIFICANT CHANGE UP (ref 6–8.3)
RBC # BLD: 4.06 M/UL — SIGNIFICANT CHANGE UP (ref 3.8–5.2)
RBC # FLD: 12.8 % — SIGNIFICANT CHANGE UP (ref 10.3–14.5)
SODIUM SERPL-SCNC: 131 MMOL/L — LOW (ref 135–145)
T PALLIDUM AB TITR SER: NEGATIVE — SIGNIFICANT CHANGE UP
TOTAL VOLUME - 24 HOUR: 5700 ML — SIGNIFICANT CHANGE UP
URINE CREATININE CALCULATION: 1.1 G/24 H — SIGNIFICANT CHANGE UP (ref 0.8–1.8)
WBC # BLD: 18.11 K/UL — HIGH (ref 3.8–10.5)
WBC # FLD AUTO: 18.11 K/UL — HIGH (ref 3.8–10.5)

## 2023-07-01 PROCEDURE — 85025 COMPLETE CBC W/AUTO DIFF WBC: CPT

## 2023-07-01 PROCEDURE — 99214 OFFICE O/P EST MOD 30 MIN: CPT

## 2023-07-01 PROCEDURE — 83615 LACTATE (LD) (LDH) ENZYME: CPT

## 2023-07-01 PROCEDURE — 86850 RBC ANTIBODY SCREEN: CPT

## 2023-07-01 PROCEDURE — 86901 BLOOD TYPING SEROLOGIC RH(D): CPT

## 2023-07-01 PROCEDURE — 86900 BLOOD TYPING SEROLOGIC ABO: CPT

## 2023-07-01 PROCEDURE — 84156 ASSAY OF PROTEIN URINE: CPT

## 2023-07-01 PROCEDURE — 84550 ASSAY OF BLOOD/URIC ACID: CPT

## 2023-07-01 PROCEDURE — 85610 PROTHROMBIN TIME: CPT

## 2023-07-01 PROCEDURE — 36415 COLL VENOUS BLD VENIPUNCTURE: CPT

## 2023-07-01 PROCEDURE — 85384 FIBRINOGEN ACTIVITY: CPT

## 2023-07-01 PROCEDURE — 80053 COMPREHEN METABOLIC PANEL: CPT

## 2023-07-01 PROCEDURE — 87081 CULTURE SCREEN ONLY: CPT

## 2023-07-01 PROCEDURE — 86780 TREPONEMA PALLIDUM: CPT

## 2023-07-01 PROCEDURE — 82570 ASSAY OF URINE CREATININE: CPT

## 2023-07-01 PROCEDURE — 83735 ASSAY OF MAGNESIUM: CPT

## 2023-07-01 PROCEDURE — 85730 THROMBOPLASTIN TIME PARTIAL: CPT

## 2023-07-01 RX ORDER — NIFEDIPINE 30 MG
1 TABLET, EXTENDED RELEASE 24 HR ORAL
Qty: 30 | Refills: 0
Start: 2023-07-01 | End: 2023-07-30

## 2023-07-01 RX ORDER — ASPIRIN/CALCIUM CARB/MAGNESIUM 324 MG
2 TABLET ORAL
Qty: 0 | Refills: 0 | DISCHARGE
Start: 2023-07-01

## 2023-07-01 RX ORDER — SODIUM CHLORIDE 9 MG/ML
1000 INJECTION, SOLUTION INTRAVENOUS
Refills: 0 | Status: DISCONTINUED | OUTPATIENT
Start: 2023-07-01 | End: 2023-07-01

## 2023-07-01 RX ORDER — LABETALOL HCL 100 MG
1 TABLET ORAL
Qty: 90 | Refills: 0
Start: 2023-07-01 | End: 2023-07-30

## 2023-07-01 RX ADMIN — Medication 300 MILLIGRAM(S): at 06:47

## 2023-07-01 RX ADMIN — Medication 12 MILLIGRAM(S): at 14:07

## 2023-07-01 RX ADMIN — Medication 30 MILLIGRAM(S): at 06:47

## 2023-07-01 RX ADMIN — Medication 300 MILLIGRAM(S): at 14:08

## 2023-07-01 NOTE — DISCHARGE NOTE ANTEPARTUM - CARE PLAN
1 Principal Discharge DX:	Chronic hypertension in pregnancy  Assessment and plan of treatment:	Follow up within your OB in one week for a blood pressure check. Check bp 3x a day. If blood pressure greater than 160/110, you develop a headache not relieved by tylenol, visual disturbances, or right upper abdominal pain, call your doctor or the hospital, or go to your nearest emergency room. Regular diet, normal activity, nothing in the vagina for 6 weeks--no sex, tampons, tub baths, or swimming pools.     Continue to take the labetalol 300mg every 8 hours and the nifedipine 30mg once a day.

## 2023-07-01 NOTE — DISCHARGE NOTE ANTEPARTUM - HOSPITAL COURSE
27yo  at 30+1 with known chronic hypertension presented with severe range BPs. She received BMZ as well as 24hrs of IV magnesium. Her home antihypertensive regimen was increased to labetalol 300mg TID and nifedipine 30mg QD with blood pressure improvement. She completed a 24hr urine, which was increased to 456. Patient was discharged with strict return precautions and will f/u in the office.

## 2023-07-01 NOTE — DISCHARGE NOTE ANTEPARTUM - PLAN OF CARE
Follow up within your OB in one week for a blood pressure check. Check bp 3x a day. If blood pressure greater than 160/110, you develop a headache not relieved by tylenol, visual disturbances, or right upper abdominal pain, call your doctor or the hospital, or go to your nearest emergency room. Regular diet, normal activity, nothing in the vagina for 6 weeks--no sex, tampons, tub baths, or swimming pools.     Continue to take the labetalol 300mg every 8 hours and the nifedipine 30mg once a day.

## 2023-07-01 NOTE — DISCHARGE NOTE ANTEPARTUM - NS MD DC FALL RISK RISK
For information on Fall & Injury Prevention, visit: https://www.[facility]/news/fall-prevention-protects-and-maintains-health-and-mobility OR  https://www.[facility]/news/fall-prevention-tips-to-avoid-injury OR  https://www.cdc.gov/steadi/patient.html For information on Fall & Injury Prevention, visit: https://www.[facility]/news/fall-prevention-protects-and-maintains-health-and-mobility OR  https://www.[facility]/news/fall-prevention-tips-to-avoid-injury OR  https://www.cdc.gov/steadi/patient.html For information on Fall & Injury Prevention, visit: https://www.[facility]/news/fall-prevention-protects-and-maintains-health-and-mobility OR  https://www.[facility]/news/fall-prevention-tips-to-avoid-injury OR  https://www.cdc.gov/steadi/patient.html

## 2023-07-01 NOTE — DISCHARGE NOTE ANTEPARTUM - PROVIDER TOKENS
PROVIDER:[TOKEN:[[id number]]] PROVIDER:[TOKEN:[[id number]]] PROVIDER:[TOKEN:[[id number]]]

## 2023-07-01 NOTE — DISCHARGE NOTE ANTEPARTUM - PATIENT PORTAL LINK FT
You can access the FollowMyHealth Patient Portal offered by [facility] by registering at the following website: [email or web address]. By joining [employer]’s FollowMyHealth portal, you will also be able to view your health information using other applications (apps) compatible with our system. You can access the FollowMyHealth Patient Portal offered by [facility] by registering at the following website: [email or web address]. By joining [employer]’s FollowMyHealth portal, you will also be able to view your health information using other applications (apps) compatible with our system. You can access the FollowMyHealth Patient Portal offered by [facility] by registering at the following website: [email or web address]. By joining [employer]’s FollowMyHealth portal, you will also be able to view your health information using other applications (apps) compatible with our system.

## 2023-07-01 NOTE — DISCHARGE NOTE ANTEPARTUM - CARE PROVIDER_API CALL
Tomasa Barnett  Obstetrics and Gynecology  [facility], [address]  [place] [address]  Phone: [phone]  Fax: [phone]  Follow Up Time:    Tomasa Barnett  Obstetrics and Gynecology  [facility], [address]  [place] [address]  Phone: [phone]  Fax: [phone]  Follow Up Time:    Tomasa Barnett  Obstetrics and Gynecology  [facility], [address]  [place] [address]  Phone: [phone]  Fax: [phone]  Follow Up Time:

## 2023-07-01 NOTE — DISCHARGE NOTE ANTEPARTUM - MEDICATION SUMMARY - MEDICATIONS TO TAKE
I will START or STAY ON the medications listed below when I get home from the hospital:    aspirin 81 mg oral tablet, chewable  -- 2 tab(s) by mouth every 24 hours  -- Indication: For Chronic hypertension    labetalol 300 mg oral tablet  -- 1 tab(s) by mouth every 8 hours Do not take for BP<110/65 or for HR<65  -- Indication: For chronic hypertension    NIFEdipine 30 mg oral tablet, extended release  -- 1 tab(s) by mouth once a day Do not take for BP<110/65 or for HR>110  -- Indication: For chronic hypertension    Prenatal Multivitamins with Folic Acid 1 mg oral tablet  -- 1 tab(s) by mouth once a day  -- Indication: For pregnancy

## 2023-07-02 LAB
CULTURE RESULTS: SIGNIFICANT CHANGE UP
SPECIMEN SOURCE: SIGNIFICANT CHANGE UP

## 2023-07-06 DIAGNOSIS — O10.913 UNSPECIFIED PRE-EXISTING HYPERTENSION COMPLICATING PREGNANCY, THIRD TRIMESTER: ICD-10-CM

## 2023-07-06 DIAGNOSIS — Z3A.30 30 WEEKS GESTATION OF PREGNANCY: ICD-10-CM

## 2023-07-10 ENCOUNTER — APPOINTMENT (OUTPATIENT)
Dept: ANTEPARTUM | Facility: CLINIC | Age: 28
End: 2023-07-10
Payer: COMMERCIAL

## 2023-07-10 ENCOUNTER — ASOB RESULT (OUTPATIENT)
Age: 28
End: 2023-07-10

## 2023-07-10 PROCEDURE — 76818 FETAL BIOPHYS PROFILE W/NST: CPT

## 2023-07-10 PROCEDURE — 76820 UMBILICAL ARTERY ECHO: CPT | Mod: 59

## 2023-07-10 PROCEDURE — 76816 OB US FOLLOW-UP PER FETUS: CPT

## 2023-07-10 PROCEDURE — 76821 MIDDLE CEREBRAL ARTERY ECHO: CPT | Mod: 59

## 2023-07-18 ENCOUNTER — ASOB RESULT (OUTPATIENT)
Age: 28
End: 2023-07-18

## 2023-07-18 ENCOUNTER — APPOINTMENT (OUTPATIENT)
Dept: ANTEPARTUM | Facility: CLINIC | Age: 28
End: 2023-07-18
Payer: COMMERCIAL

## 2023-07-18 PROCEDURE — 76818 FETAL BIOPHYS PROFILE W/NST: CPT

## 2023-07-18 PROCEDURE — 76815 OB US LIMITED FETUS(S): CPT

## 2023-07-21 ENCOUNTER — ASOB RESULT (OUTPATIENT)
Age: 28
End: 2023-07-21

## 2023-07-21 ENCOUNTER — APPOINTMENT (OUTPATIENT)
Dept: ANTEPARTUM | Facility: CLINIC | Age: 28
End: 2023-07-21
Payer: COMMERCIAL

## 2023-07-21 PROCEDURE — 76820 UMBILICAL ARTERY ECHO: CPT

## 2023-07-21 PROCEDURE — 76821 MIDDLE CEREBRAL ARTERY ECHO: CPT

## 2023-07-21 PROCEDURE — 76818 FETAL BIOPHYS PROFILE W/NST: CPT

## 2023-07-25 ENCOUNTER — ASOB RESULT (OUTPATIENT)
Age: 28
End: 2023-07-25

## 2023-07-25 ENCOUNTER — APPOINTMENT (OUTPATIENT)
Dept: ANTEPARTUM | Facility: CLINIC | Age: 28
End: 2023-07-25
Payer: COMMERCIAL

## 2023-07-25 ENCOUNTER — INPATIENT (INPATIENT)
Facility: HOSPITAL | Age: 28
LOS: 5 days | Discharge: HOME | End: 2023-07-31
Attending: OBSTETRICS & GYNECOLOGY | Admitting: OBSTETRICS & GYNECOLOGY
Payer: COMMERCIAL

## 2023-07-25 VITALS — TEMPERATURE: 98 F | DIASTOLIC BLOOD PRESSURE: 90 MMHG | RESPIRATION RATE: 17 BRPM | SYSTOLIC BLOOD PRESSURE: 150 MMHG

## 2023-07-25 DIAGNOSIS — O26.899 OTHER SPECIFIED PREGNANCY RELATED CONDITIONS, UNSPECIFIED TRIMESTER: ICD-10-CM

## 2023-07-25 DIAGNOSIS — Z3A.00 WEEKS OF GESTATION OF PREGNANCY NOT SPECIFIED: ICD-10-CM

## 2023-07-25 LAB
ALBUMIN SERPL ELPH-MCNC: 3.4 G/DL — SIGNIFICANT CHANGE UP (ref 3.3–5)
ALP SERPL-CCNC: 106 U/L — SIGNIFICANT CHANGE UP (ref 40–120)
ALT FLD-CCNC: 41 U/L — SIGNIFICANT CHANGE UP (ref 10–45)
ANION GAP SERPL CALC-SCNC: 12 MMOL/L — SIGNIFICANT CHANGE UP (ref 5–17)
APTT BLD: 26.3 SEC — LOW (ref 27.5–35.5)
AST SERPL-CCNC: 22 U/L — SIGNIFICANT CHANGE UP (ref 10–40)
BASOPHILS # BLD AUTO: 0.06 K/UL — SIGNIFICANT CHANGE UP (ref 0–0.2)
BASOPHILS NFR BLD AUTO: 0.5 % — SIGNIFICANT CHANGE UP (ref 0–2)
BILIRUB SERPL-MCNC: 0.3 MG/DL — SIGNIFICANT CHANGE UP (ref 0.2–1.2)
BLD GP AB SCN SERPL QL: NEGATIVE — SIGNIFICANT CHANGE UP
BUN SERPL-MCNC: 14 MG/DL — SIGNIFICANT CHANGE UP (ref 7–23)
CALCIUM SERPL-MCNC: 9.5 MG/DL — SIGNIFICANT CHANGE UP (ref 8.4–10.5)
CHLORIDE SERPL-SCNC: 106 MMOL/L — SIGNIFICANT CHANGE UP (ref 96–108)
CO2 SERPL-SCNC: 21 MMOL/L — LOW (ref 22–31)
CREAT ?TM UR-MCNC: 9 MG/DL — SIGNIFICANT CHANGE UP
CREAT SERPL-MCNC: 0.64 MG/DL — SIGNIFICANT CHANGE UP (ref 0.5–1.3)
EGFR: 123 ML/MIN/1.73M2 — SIGNIFICANT CHANGE UP
EOSINOPHIL # BLD AUTO: 0.1 K/UL — SIGNIFICANT CHANGE UP (ref 0–0.5)
EOSINOPHIL NFR BLD AUTO: 0.8 % — SIGNIFICANT CHANGE UP (ref 0–6)
FIBRINOGEN PPP-MCNC: 712 MG/DL — HIGH (ref 200–445)
GLUCOSE SERPL-MCNC: 83 MG/DL — SIGNIFICANT CHANGE UP (ref 70–99)
HCT VFR BLD CALC: 38.9 % — SIGNIFICANT CHANGE UP (ref 34.5–45)
HGB BLD-MCNC: 12.9 G/DL — SIGNIFICANT CHANGE UP (ref 11.5–15.5)
IMM GRANULOCYTES NFR BLD AUTO: 0.7 % — SIGNIFICANT CHANGE UP (ref 0–0.9)
INR BLD: 0.89 — SIGNIFICANT CHANGE UP (ref 0.88–1.16)
LDH SERPL L TO P-CCNC: 144 U/L — SIGNIFICANT CHANGE UP (ref 50–242)
LYMPHOCYTES # BLD AUTO: 2.76 K/UL — SIGNIFICANT CHANGE UP (ref 1–3.3)
LYMPHOCYTES # BLD AUTO: 21.4 % — SIGNIFICANT CHANGE UP (ref 13–44)
MCHC RBC-ENTMCNC: 30.4 PG — SIGNIFICANT CHANGE UP (ref 27–34)
MCHC RBC-ENTMCNC: 33.2 GM/DL — SIGNIFICANT CHANGE UP (ref 32–36)
MCV RBC AUTO: 91.7 FL — SIGNIFICANT CHANGE UP (ref 80–100)
MONOCYTES # BLD AUTO: 0.88 K/UL — SIGNIFICANT CHANGE UP (ref 0–0.9)
MONOCYTES NFR BLD AUTO: 6.8 % — SIGNIFICANT CHANGE UP (ref 2–14)
NEUTROPHILS # BLD AUTO: 9.02 K/UL — HIGH (ref 1.8–7.4)
NEUTROPHILS NFR BLD AUTO: 69.8 % — SIGNIFICANT CHANGE UP (ref 43–77)
NRBC # BLD: 0 /100 WBCS — SIGNIFICANT CHANGE UP (ref 0–0)
PLATELET # BLD AUTO: 318 K/UL — SIGNIFICANT CHANGE UP (ref 150–400)
POTASSIUM SERPL-MCNC: 4.6 MMOL/L — SIGNIFICANT CHANGE UP (ref 3.5–5.3)
POTASSIUM SERPL-SCNC: 4.6 MMOL/L — SIGNIFICANT CHANGE UP (ref 3.5–5.3)
PROT ?TM UR-MCNC: <4 MG/DL — SIGNIFICANT CHANGE UP (ref 0–12)
PROT SERPL-MCNC: 7.1 G/DL — SIGNIFICANT CHANGE UP (ref 6–8.3)
PROT/CREAT UR-RTO: SIGNIFICANT CHANGE UP (ref 0–0.2)
PROTHROM AB SERPL-ACNC: 10.6 SEC — SIGNIFICANT CHANGE UP (ref 10.5–13.4)
RBC # BLD: 4.24 M/UL — SIGNIFICANT CHANGE UP (ref 3.8–5.2)
RBC # FLD: 13.1 % — SIGNIFICANT CHANGE UP (ref 10.3–14.5)
RH IG SCN BLD-IMP: POSITIVE — SIGNIFICANT CHANGE UP
SODIUM SERPL-SCNC: 139 MMOL/L — SIGNIFICANT CHANGE UP (ref 135–145)
URATE SERPL-MCNC: 6.2 MG/DL — SIGNIFICANT CHANGE UP (ref 2.5–7)
WBC # BLD: 12.91 K/UL — HIGH (ref 3.8–10.5)
WBC # FLD AUTO: 12.91 K/UL — HIGH (ref 3.8–10.5)

## 2023-07-25 PROCEDURE — 76821 MIDDLE CEREBRAL ARTERY ECHO: CPT | Mod: 59

## 2023-07-25 PROCEDURE — 76818 FETAL BIOPHYS PROFILE W/NST: CPT

## 2023-07-25 PROCEDURE — 76820 UMBILICAL ARTERY ECHO: CPT | Mod: 59

## 2023-07-25 PROCEDURE — 76816 OB US FOLLOW-UP PER FETUS: CPT

## 2023-07-25 RX ORDER — LABETALOL HCL 100 MG
100 TABLET ORAL ONCE
Refills: 0 | Status: COMPLETED | OUTPATIENT
Start: 2023-07-25 | End: 2023-07-25

## 2023-07-25 RX ORDER — NIFEDIPINE 30 MG
30 TABLET, EXTENDED RELEASE 24 HR ORAL EVERY 24 HOURS
Refills: 0 | Status: DISCONTINUED | OUTPATIENT
Start: 2023-07-26 | End: 2023-07-27

## 2023-07-25 RX ORDER — LABETALOL HCL 100 MG
300 TABLET ORAL EVERY 8 HOURS
Refills: 0 | Status: DISCONTINUED | OUTPATIENT
Start: 2023-07-25 | End: 2023-07-27

## 2023-07-25 RX ORDER — ASPIRIN/CALCIUM CARB/MAGNESIUM 324 MG
162 TABLET ORAL DAILY
Refills: 0 | Status: DISCONTINUED | OUTPATIENT
Start: 2023-07-25 | End: 2023-07-26

## 2023-07-25 RX ADMIN — Medication 12 MILLIGRAM(S): at 12:12

## 2023-07-25 RX ADMIN — Medication 100 MILLIGRAM(S): at 18:51

## 2023-07-25 RX ADMIN — Medication 300 MILLIGRAM(S): at 21:42

## 2023-07-25 RX ADMIN — Medication 1 TABLET(S): at 14:34

## 2023-07-25 RX ADMIN — Medication 300 MILLIGRAM(S): at 14:34

## 2023-07-25 NOTE — PATIENT PROFILE OB - FALL HARM RISK - UNIVERSAL INTERVENTIONS
Bed in lowest position, wheels locked, appropriate side rails in place/Call bell, personal items and telephone in reach/Instruct patient to call for assistance before getting out of bed or chair/Non-slip footwear when patient is out of bed/[place] to call system/Physically safe environment - no spills, clutter or unnecessary equipment/Purposeful Proactive Rounding/Room/bathroom lighting operational, light cord in reach Bed in lowest position, wheels locked, appropriate side rails in place/Call bell, personal items and telephone in reach/Instruct patient to call for assistance before getting out of bed or chair/Non-slip footwear when patient is out of bed/[place] to call system/Physically safe environment - no spills, clutter or unnecessary equipment/Purposeful Proactive Rounding/Room/bathroom lighting operational, light cord in reach Bed in lowest position, wheels locked, appropriate side rails in place/Call bell, personal items and telephone in reach/Instruct patient to call for assistance before getting out of bed or chair/Non-slip footwear when patient is out of bed/[place] to call system/Physically safe environment - no spills, clutter or unnecessary equipment/Purposeful Proactive Rounding/Room/bathroom lighting operational, light cord in reach

## 2023-07-26 ENCOUNTER — ASOB RESULT (OUTPATIENT)
Age: 28
End: 2023-07-26

## 2023-07-26 ENCOUNTER — TRANSCRIPTION ENCOUNTER (OUTPATIENT)
Age: 28
End: 2023-07-26

## 2023-07-26 ENCOUNTER — APPOINTMENT (OUTPATIENT)
Dept: ANTEPARTUM | Facility: CLINIC | Age: 28
End: 2023-07-26
Payer: COMMERCIAL

## 2023-07-26 LAB
ALBUMIN SERPL ELPH-MCNC: 3.4 G/DL — SIGNIFICANT CHANGE UP (ref 3.3–5)
ALP SERPL-CCNC: 106 U/L — SIGNIFICANT CHANGE UP (ref 40–120)
ALT FLD-CCNC: 36 U/L — SIGNIFICANT CHANGE UP (ref 10–45)
ANION GAP SERPL CALC-SCNC: 16 MMOL/L — SIGNIFICANT CHANGE UP (ref 5–17)
APTT BLD: 27.8 SEC — SIGNIFICANT CHANGE UP (ref 24.5–35.6)
AST SERPL-CCNC: 17 U/L — SIGNIFICANT CHANGE UP (ref 10–40)
BASOPHILS # BLD AUTO: 0.05 K/UL — SIGNIFICANT CHANGE UP (ref 0–0.2)
BASOPHILS NFR BLD AUTO: 0.2 % — SIGNIFICANT CHANGE UP (ref 0–2)
BILIRUB SERPL-MCNC: 0.2 MG/DL — SIGNIFICANT CHANGE UP (ref 0.2–1.2)
BUN SERPL-MCNC: 14 MG/DL — SIGNIFICANT CHANGE UP (ref 7–23)
CALCIUM SERPL-MCNC: 9.5 MG/DL — SIGNIFICANT CHANGE UP (ref 8.4–10.5)
CHLORIDE SERPL-SCNC: 101 MMOL/L — SIGNIFICANT CHANGE UP (ref 96–108)
CO2 SERPL-SCNC: 17 MMOL/L — LOW (ref 22–31)
COVID-19 SPIKE DOMAIN AB INTERP: POSITIVE
COVID-19 SPIKE DOMAIN ANTIBODY RESULT: >250 U/ML — HIGH
CREAT SERPL-MCNC: 0.64 MG/DL — SIGNIFICANT CHANGE UP (ref 0.5–1.3)
EGFR: 123 ML/MIN/1.73M2 — SIGNIFICANT CHANGE UP
EOSINOPHIL # BLD AUTO: 0 K/UL — SIGNIFICANT CHANGE UP (ref 0–0.5)
EOSINOPHIL NFR BLD AUTO: 0 % — SIGNIFICANT CHANGE UP (ref 0–6)
FIBRINOGEN PPP-MCNC: 671 MG/DL — HIGH (ref 200–445)
GLUCOSE SERPL-MCNC: 109 MG/DL — HIGH (ref 70–99)
HCT VFR BLD CALC: 40.6 % — SIGNIFICANT CHANGE UP (ref 34.5–45)
HGB BLD-MCNC: 13.3 G/DL — SIGNIFICANT CHANGE UP (ref 11.5–15.5)
IMM GRANULOCYTES NFR BLD AUTO: 0.6 % — SIGNIFICANT CHANGE UP (ref 0–0.9)
INR BLD: 0.92 — SIGNIFICANT CHANGE UP (ref 0.85–1.18)
LYMPHOCYTES # BLD AUTO: 12.2 % — LOW (ref 13–44)
LYMPHOCYTES # BLD AUTO: 2.46 K/UL — SIGNIFICANT CHANGE UP (ref 1–3.3)
MCHC RBC-ENTMCNC: 30.4 PG — SIGNIFICANT CHANGE UP (ref 27–34)
MCHC RBC-ENTMCNC: 32.8 GM/DL — SIGNIFICANT CHANGE UP (ref 32–36)
MCV RBC AUTO: 92.9 FL — SIGNIFICANT CHANGE UP (ref 80–100)
MONOCYTES # BLD AUTO: 0.91 K/UL — HIGH (ref 0–0.9)
MONOCYTES NFR BLD AUTO: 4.5 % — SIGNIFICANT CHANGE UP (ref 2–14)
NEUTROPHILS # BLD AUTO: 16.55 K/UL — HIGH (ref 1.8–7.4)
NEUTROPHILS NFR BLD AUTO: 82.5 % — HIGH (ref 43–77)
NRBC # BLD: 0 /100 WBCS — SIGNIFICANT CHANGE UP (ref 0–0)
PLATELET # BLD AUTO: 328 K/UL — SIGNIFICANT CHANGE UP (ref 150–400)
POTASSIUM SERPL-MCNC: 4.1 MMOL/L — SIGNIFICANT CHANGE UP (ref 3.5–5.3)
POTASSIUM SERPL-SCNC: 4.1 MMOL/L — SIGNIFICANT CHANGE UP (ref 3.5–5.3)
PROT SERPL-MCNC: 7.1 G/DL — SIGNIFICANT CHANGE UP (ref 6–8.3)
PROTHROM AB SERPL-ACNC: 10.5 SEC — SIGNIFICANT CHANGE UP (ref 9.5–13)
RBC # BLD: 4.37 M/UL — SIGNIFICANT CHANGE UP (ref 3.8–5.2)
RBC # FLD: 13 % — SIGNIFICANT CHANGE UP (ref 10.3–14.5)
SARS-COV-2 IGG+IGM SERPL QL IA: >250 U/ML — HIGH
SARS-COV-2 IGG+IGM SERPL QL IA: POSITIVE
SODIUM SERPL-SCNC: 134 MMOL/L — LOW (ref 135–145)
T PALLIDUM AB TITR SER: NEGATIVE — SIGNIFICANT CHANGE UP
WBC # BLD: 20.1 K/UL — HIGH (ref 3.8–10.5)
WBC # FLD AUTO: 20.1 K/UL — HIGH (ref 3.8–10.5)

## 2023-07-26 PROCEDURE — 93306 TTE W/DOPPLER COMPLETE: CPT | Mod: 26

## 2023-07-26 PROCEDURE — 76820 UMBILICAL ARTERY ECHO: CPT | Mod: 26,59

## 2023-07-26 PROCEDURE — 76821 MIDDLE CEREBRAL ARTERY ECHO: CPT | Mod: 26,59

## 2023-07-26 PROCEDURE — 99253 IP/OBS CNSLTJ NEW/EST LOW 45: CPT

## 2023-07-26 PROCEDURE — 76816 OB US FOLLOW-UP PER FETUS: CPT | Mod: 26

## 2023-07-26 PROCEDURE — 76819 FETAL BIOPHYS PROFIL W/O NST: CPT | Mod: 26

## 2023-07-26 RX ORDER — OXYTOCIN 10 UNIT/ML
2 VIAL (ML) INJECTION
Qty: 30 | Refills: 0 | Status: DISCONTINUED | OUTPATIENT
Start: 2023-07-26 | End: 2023-07-27

## 2023-07-26 RX ORDER — SODIUM CHLORIDE 9 MG/ML
1000 INJECTION, SOLUTION INTRAVENOUS
Refills: 0 | Status: DISCONTINUED | OUTPATIENT
Start: 2023-07-26 | End: 2023-07-27

## 2023-07-26 RX ADMIN — Medication 300 MILLIGRAM(S): at 23:03

## 2023-07-26 RX ADMIN — Medication 1 TABLET(S): at 11:45

## 2023-07-26 RX ADMIN — Medication 2 MILLIUNIT(S)/MIN: at 18:00

## 2023-07-26 RX ADMIN — SODIUM CHLORIDE 125 MILLILITER(S): 9 INJECTION, SOLUTION INTRAVENOUS at 12:01

## 2023-07-26 RX ADMIN — Medication 12 MILLIGRAM(S): at 11:44

## 2023-07-26 RX ADMIN — Medication 162 MILLIGRAM(S): at 11:44

## 2023-07-26 RX ADMIN — Medication 300 MILLIGRAM(S): at 07:21

## 2023-07-26 RX ADMIN — Medication 30 MILLIGRAM(S): at 08:26

## 2023-07-26 RX ADMIN — Medication 300 MILLIGRAM(S): at 14:11

## 2023-07-26 NOTE — CONSULT NOTE ADULT - ASSESSMENT
XXXXX      Recommendations:    #XX  -  -  -      #XX  -  -  -      Recommendations above are preliminary pending discussion with an attending caridologist  Plan was discussed with primary team  We'll continue to follow, thank you for the consultation      Brian eRdd (PGY5)  Cardiovascular Disease Fellow  Consult Pager: [phone]         XXXXX      Recommendations:    #XX  -  -  -      #XX  -  -  -      Recommendations above are preliminary pending discussion with an attending caridologist  Plan was discussed with primary team  We'll continue to follow, thank you for the consultation      Brian Redd (PGY5)  Cardiovascular Disease Fellow  Consult Pager: [phone]         XXXXX      Recommendations:    #XX  -  -  -      #XX  -  -  -      Recommendations above are preliminary pending discussion with an attending caridologist  Plan was discussed with primary team  We'll continue to follow, thank you for the consultation      Brian Redd (PGY5)  Cardiovascular Disease Fellow  Consult Pager: [phone]         28yF  pmhx cHTN (hospitalized for Hypertensive emergnecy) presents to [facility] antepartum for placental monitoring in the setting of late decelerations, cardiology consulted for cHTN mgmt.    Review of Studies:  ECG : NSR w/nonspecific T wave abnormality in V3  TTE : unremarkable    Recommendations:    #cHTN  -Recent hospitalization for preeclampsia w/SF in  for which at that time labetalol was increased from 100mg TID to 300mg TID  -BP currently within goal <140/90  -C/w nifedipine 30mg XL qd w/holding parameters SBP <110 and/or HR <60  -C/w Labetalol 300mg TID w/holding parameters SBP <110 and/or HR <60  -If blood pressure is persistently over SBP of 140mmhg without s/s of increased sympathetic outflow (hypovolemia, fever, pain, etc), can give additiona 100mg PO labetalol x1 then increased standing labetalol to 400mg TID      Pending discussion w/cardiology attending  Pending discussion w/primary team  We will continue to follow, thank you for the consultation        Brian Redd (PGY5)  Cardiovascular Disease Fellow  Consult Pager: [phone]         28yF  pmhx cHTN (hospitalized for Hypertensive emergnecy) presents to [facility] antepartum for placental monitoring in the setting of late decelerations, cardiology consulted for cHTN mgmt.    Review of Studies:  ECG : NSR w/nonspecific T wave abnormality in V3  TTE : unremarkable    Recommendations:    #cHTN  -Recent hospitalization for preeclampsia w/SF in  for which at that time labetalol was increased from 100mg TID to 300mg TID  -BP currently within goal <140/90  -C/w nifedipine 30mg XL qd w/holding parameters SBP <110 and/or HR <60  -C/w Labetalol 300mg TID w/holding parameters SBP <110 and/or HR <60  -If blood pressure is persistently over SBP of 140mmhg without s/s of increased sympathetic outflow (hypovolemia, fever, pain, etc), can give additiona 100mg PO labetalol x1 then increased standing labetalol to 400mg TID      Pending discussion w/cardiology attending  Pending discussion w/primary team  We will continue to follow, thank you for the consultation        Brian Redd (PGY5)  Cardiovascular Disease Fellow  Consult Pager: [phone]         28yF  pmhx cHTN (hospitalized for Hypertensive emergnecy) presents to [facility] antepartum for placental monitoring in the setting of late decelerations, cardiology consulted for cHTN mgmt.    Review of Studies:  ECG : NSR w/nonspecific T wave abnormality in V3  TTE : unremarkable    Recommendations:    #cHTN  -Recent hospitalization for preeclampsia w/SF in  for which at that time labetalol was increased from 100mg TID to 300mg TID  -BP currently within goal <140/90  -C/w nifedipine 30mg XL qd w/holding parameters SBP <110 and/or HR <60  -C/w Labetalol 300mg TID w/holding parameters SBP <110 and/or HR <60  -If blood pressure is persistently over SBP of 140mmhg without s/s of increased sympathetic outflow (hypovolemia, fever, pain, etc), can give additiona 100mg PO labetalol x1 then increased standing labetalol to 400mg TID      Pending discussion w/cardiology attending  Pending discussion w/primary team  We will continue to follow, thank you for the consultation        Brian Redd (PGY5)  Cardiovascular Disease Fellow  Consult Pager: [phone]         28yF  pmhx [place] (hospitalized for Hypertensive emergency years ago) presents to [facility] antepartum for placental monitoring in the setting of late decelerations, cardiology consulted for cHTN mgmt.    Review of Studies:  ECG : NSR w/nonspecific T wave abnormality in V3  TTE : unremarkable    Recommendations:    #cHTN  -Recent hospitalization for preeclampsia w/SF in  for which at that time labetalol was increased from 100mg TID to 300mg TID  -BP currently within goal <140/90  -C/w nifedipine 30mg XL qd w/holding parameters SBP <110 and/or HR <60  -C/w Labetalol 300mg TID w/holding parameters SBP <110 and/or HR <60  -If blood pressure is persistently over SBP of 140mmhg without s/s of increased sympathetic outflow (hypovolemia, fever, pain, etc), can give additiona 100mg PO labetalol x1 then increased standing labetalol to 400mg TID      Case reviewed w/attending cardiologist  Plan discussed w/primary team  We will continue to follow, thank you for the consultation      Brian Redd (PGY5)  Cardiovascular Disease Fellow  Consult Pager: [phone]         28yF  pmhx [place] (hospitalized for Hypertensive emergency years ago) presents to [facility] antepartum for placental monitoring in the setting of late decelerations, cardiology consulted for cHTN mgmt.    Review of Studies:  ECG : NSR w/nonspecific T wave abnormality in V3  TTE : unremarkable    Recommendations:    #cHTN  -Recent hospitalization for preeclampsia w/SF in  for which at that time labetalol was increased from 100mg TID to 300mg TID  -BP currently within goal <140/90  -C/w nifedipine 30mg XL qd w/holding parameters SBP <110 and/or HR <60  -C/w Labetalol 300mg TID w/holding parameters SBP <110 and/or HR <60  -If blood pressure is persistently over SBP of 140mmhg without s/s of increased sympathetic outflow (hypovolemia, fever, pain, etc), can give additiona 100mg PO labetalol x1 then increased standing labetalol to 400mg TID      Case reviewed w/attending cardiologist  Plan discussed w/primary team  We will continue to follow, thank you for the consultation      Brian Redd (PGY5)  Cardiovascular Disease Fellow  Consult Pager: [phone]         28yF  pmhx [place] (hospitalized for Hypertensive emergency years ago) presents to [facility] antepartum for placental monitoring in the setting of late decelerations, cardiology consulted for cHTN mgmt.    Review of Studies:  ECG : NSR w/nonspecific T wave abnormality in V3  TTE : unremarkable    Recommendations:    #cHTN  -Recent hospitalization for preeclampsia w/SF in  for which at that time labetalol was increased from 100mg TID to 300mg TID  -BP currently within goal <140/90  -C/w nifedipine 30mg XL qd w/holding parameters SBP <110 and/or HR <60  -C/w Labetalol 300mg TID w/holding parameters SBP <110 and/or HR <60  -If blood pressure is persistently over SBP of 140mmhg without s/s of increased sympathetic outflow (hypovolemia, fever, pain, etc), can give additiona 100mg PO labetalol x1 then increased standing labetalol to 400mg TID      Case reviewed w/attending cardiologist  Plan discussed w/primary team  We will continue to follow, thank you for the consultation      Brian Redd (PGY5)  Cardiovascular Disease Fellow  Consult Pager: [phone]         28yF  pmhx cHTN (hospitalized for Hypertensive emergency years ago) presents to [facility] antepartum for placental monitoring in the setting of late decelerations, cardiology consulted for cHTN mgmt.    Review of Studies:  ECG : NSR w/nonspecific T wave abnormality in V3  TTE : unremarkable    Recommendations:    #cHTN  -Recent hospitalization for preeclampsia w/SF in  for which at that time labetalol was increased from 100mg TID to 300mg TID  -BP currently within goal <140/90  -C/w nifedipine 30mg XL qd w/holding parameters SBP <110 and/or HR <60  -C/w Labetalol 300mg TID w/holding parameters SBP <110 and/or HR <60  -If blood pressure is persistently over SBP of 140mmhg without s/s of increased sympathetic outflow (hypovolemia, fever, pain, etc), can give additiona 100mg PO labetalol x1 then increased standing labetalol to 400mg TID      Case reviewed w/attending cardiologist  Plan discussed w/primary team  We will be signing off, thank you for the consultation      Brian Redd (PGY5)  Cardiovascular Disease Fellow  Consult Pager: [phone]         28yF  pmhx cHTN (hospitalized for Hypertensive emergency years ago) presents to [facility] antepartum for placental monitoring in the setting of late decelerations, cardiology consulted for cHTN mgmt.    Review of Studies:  ECG : NSR w/nonspecific T wave abnormality in V3  TTE : unremarkable    Recommendations:    #cHTN  -Recent hospitalization for preeclampsia w/SF in  for which at that time labetalol was increased from 100mg TID to 300mg TID  -BP currently within goal <140/90  -C/w nifedipine 30mg XL qd w/holding parameters SBP <110 and/or HR <60  -C/w Labetalol 300mg TID w/holding parameters SBP <110 and/or HR <60  -If blood pressure is persistently over SBP of 140mmhg without s/s of increased sympathetic outflow (hypovolemia, fever, pain, etc), can give additiona 100mg PO labetalol x1 then increased standing labetalol to 400mg TID      Case reviewed w/attending cardiologist  Plan discussed w/primary team  We will be signing off, thank you for the consultation      Brian Redd (PGY5)  Cardiovascular Disease Fellow  Consult Pager: [phone]         28yF  pmhx cHTN (hospitalized for Hypertensive emergency years ago) presents to [facility] antepartum for placental monitoring in the setting of late decelerations, cardiology consulted for cHTN mgmt.    Review of Studies:  ECG : NSR w/nonspecific T wave abnormality in V3  TTE : unremarkable    Recommendations:    #cHTN  -Recent hospitalization for preeclampsia w/SF in  for which at that time labetalol was increased from 100mg TID to 300mg TID  -BP currently within goal <140/90  -C/w nifedipine 30mg XL qd w/holding parameters SBP <110 and/or HR <60  -C/w Labetalol 300mg TID w/holding parameters SBP <110 and/or HR <60  -If blood pressure is persistently over SBP of 140mmhg without s/s of increased sympathetic outflow (hypovolemia, fever, pain, etc), can give additiona 100mg PO labetalol x1 then increased standing labetalol to 400mg TID      Case reviewed w/attending cardiologist  Plan discussed w/primary team  We will be signing off, thank you for the consultation      Brian Redd (PGY5)  Cardiovascular Disease Fellow  Consult Pager: [phone]

## 2023-07-26 NOTE — CONSULT NOTE ADULT - ATTENDING COMMENTS
28yF  with h/o cHTN admitted for OB indications   Hospitalized in  for HTN, BP medications titrated  BP since has been controlled  Currently feeling well, denies CP, SOB, palpitations, dizziness or edema    Review of Studies:  ECG : NSR w/nonspecific T wave abnormality in V3  TTE : unremarkable    O/E   Gen NAD  CVS:S1S2, RRR  Lung: CTA  Extrem: no edema    A/P  #cHTN  -BP goal <140/90  -C/w nifedipine 30mg XL qd    -C/w Labetalol 300mg TID    - discussed signs and symptoms of heart disease and when to seek care  - please reconsult prn

## 2023-07-26 NOTE — CONSULT NOTE ADULT - SUBJECTIVE AND OBJECTIVE BOX
**Incomplete Note**  Cardiology Consult      HPI:        Pt seen and examined at bedside, NAD, denies chest pain/pressure/discomfort. ROS s/f ?,      Review of Systems:  CONSTITUTIONAL:  No weight loss, fever, chills, weakness or fatigue.  HEENT:  Eyes:  No visual loss, blurred vision, double vision or yellow sclerae. Ears, Nose, Throat:  No hearing loss, sneezing, congestion, runny nose or sore throat.  SKIN:  No rash or itching.  CARDIOVASCULAR:  No chest pain, chest pressure or chest discomfort. No palpitations or edema.  RESPIRATORY:  No shortness of breath, cough or sputum.  GASTROINTESTINAL:  No anorexia, nausea, vomiting or diarrhea. No abdominal pain or blood.  GENITOURINARY: No Burning on urination.   NEUROLOGICAL:  see HPI  MUSCULOSKELETAL:  No muscle, back pain, joint pain or stiffness.  HEMATOLOGIC:  No anemia, bleeding or bruising.  LYMPHATICS:  No enlarged nodes. No history of splenectomy.  PSYCHIATRIC:  No history of depression or anxiety.  ENDOCRINOLOGIC:  No reports of sweating, cold or heat intolerance. No polyuria or polydipsia.  ALLERGIES:  No history of asthma, hives, eczema or rhinitis.    PAST MEDICAL & SURGICAL HISTORY:    SOCIAL HISTORY:  FAMILY HISTORY:    ALLERGIES: 	  sulfa drugs (Rash)          MEDICATIONS:  aspirin  chewable 162 milliGRAM(s) Oral daily  betamethasone Injectable 12 milliGRAM(s) IntraMuscular every 24 hours  labetalol 300 milliGRAM(s) Oral every 8 hours  NIFEdipine XL 30 milliGRAM(s) Oral every 24 hours  prenatal multivitamin 1 Tablet(s) Oral daily      T(C): 36.8 (07-26-23 @ 06:00), Max: 36.9 (07-25-23 @ 21:43)  HR: 69 (07-26-23 @ 07:02) (69 - 100)  BP: 129/79 (07-26-23 @ 07:02) (100/62 - 150/90)  RR: 18 (07-26-23 @ 06:00) (16 - 18)  SpO2: 98% (07-26-23 @ 07:02) (96% - 100%)      PHYSICAL EXAM:    Constitutional: resting comfortably in bed; NAD  HEENT: NC/AT, PERRL, EOMI, anicteric sclera, no nasal discharge; uvula midline, no oropharyngeal erythema or exudates; MMM  Neck: supple; no thyromegaly, JVP ? cm H20, JVD +/-  Respiratory: CTA B/L; no W/R/R, no retractions  Cardiac: +S1/S2; RRR; no M/R/G; PMI non-displaced  Gastrointestinal: soft, NT/ND; no rebound or guarding; +BSx4  Extremities: WWP, no clubbing or cyanosis; no peripheral edema  Musculoskeletal: NROM x4; no joint swelling, tenderness or erythema  Vascular: 2+ radial, DP/PT pulses B/L  Dermatologic: skin warm, dry and intact; no rashes, wounds, or scars  Lymphatic: no submandibular or cervical LAD  Neurologic: AAOx3; CNII-XII grossly intact; no focal deficits        I&O's Summary      LABS:	 	                        12.9   12.91 )-----------( 318      ( 25 Jul 2023 09:35 )             38.9     07-25    139  |  106  |  14  ----------------------------<  83  4.6   |  21<L>  |  0.64    Ca    9.5      25 Jul 2023 09:35    TPro  7.1  /  Alb  3.4  /  TBili  0.3  /  DBili  x   /  AST  22  /  ALT  41  /  AlkPhos  106  07-25        proBNP:   Lipid Profile:   HgA1c:   TSH:     TELEMETRY: 	    ECG:  	  RADIOLOGY:   ECHO:  STRESS:  CATH:   **Incomplete Note**  Cardiology Consult      HPI: 28yF  pmhx cHTN (hospitalized for Hypertensive emergnecy) presents to [facility] antepartum for placental monitoring in the setting of late decelerations, cardiology consulted for cHTN mgmt. She had a hypertensive emergency 3-4 years ago and went to [facility] with admission to MICU for nicardipine gtt. Prior to her pregnancy, she was on labetalol 100mg TID and another drug starting with a "T"- the medication she does not remember was d/c and replaced w/nifedipine 30mg xl qd by her PCP. During her current and first pregnancy, she was found to have an SBP above 160 at the end of  by her OB and was sent to [facility] for which she had her labetalol increased to 300mg TID and kept on nifedipine 30mg XL qd. On ROS, pt denies: fevers, chills, myalgias, dizziness, weakness, HA, dysphagia, dysarthria, changes in vision, CP/chest discomfort, palpitations, SOB, cough, PND, orthopnea, N/V/D/C, abdominal pain, dysuria, urinary urgency/increased frequency, changes in bowel movements, melena, hematochezia, LE edema, joint pain, or unintentional weightloss. ROS otherwise negative.        Pt seen and examined at bedside, NAD, denies chest pain/pressure/discomfort. ROS negative.      Review of Systems:  CONSTITUTIONAL:  No weight loss, fever, chills, weakness or fatigue.  HEENT:  Eyes:  No visual loss, blurred vision, double vision or yellow sclerae. Ears, Nose, Throat:  No hearing loss, sneezing, congestion, runny nose or sore throat.  SKIN:  No rash or itching.  CARDIOVASCULAR:  No chest pain, chest pressure or chest discomfort. No palpitations or edema.  RESPIRATORY:  No shortness of breath, cough or sputum.  GASTROINTESTINAL:  No anorexia, nausea, vomiting or diarrhea. No abdominal pain or blood.  GENITOURINARY: No Burning on urination.   NEUROLOGICAL:  see HPI  MUSCULOSKELETAL:  No muscle, back pain, joint pain or stiffness.  HEMATOLOGIC:  No anemia, bleeding or bruising.  LYMPHATICS:  No enlarged nodes. No history of splenectomy.  PSYCHIATRIC:  No history of depression or anxiety.  ENDOCRINOLOGIC:  No reports of sweating, cold or heat intolerance. No polyuria or polydipsia.  ALLERGIES:  No history of asthma, hives, eczema or rhinitis.    PAST MEDICAL & SURGICAL HISTORY:  3-5 years ago, [facility] admission for hypertensive emergency requiring cardine gtt    ALLERGIES: 	  sulfa drugs (Rash)          MEDICATIONS:  aspirin  chewable 162 milliGRAM(s) Oral daily  betamethasone Injectable 12 milliGRAM(s) IntraMuscular every 24 hours  labetalol 300 milliGRAM(s) Oral every 8 hours  NIFEdipine XL 30 milliGRAM(s) Oral every 24 hours  prenatal multivitamin 1 Tablet(s) Oral daily      T(C): 36.8 (23 @ 06:00), Max: 36.9 (23 @ 21:43)  HR: 69 (23 @ 07:02) (69 - 100)  BP: 129/79 (23 @ 07:02) (100/62 - 150/90)  RR: 18 (23 @ 06:00) (16 - 18)  SpO2: 98% (23 @ 07:02) (96% - 100%)      PHYSICAL EXAM:    Constitutional: resting comfortably in bed; NAD  HEENT: NC/AT, PERRL, EOMI, anicteric sclera, no nasal discharge; uvula midline, no oropharyngeal erythema or exudates; MMM  Neck: supple; no thyromegaly, JVP <8 cm H20, JVD -  Respiratory: CTA B/L; no W/R/R, no retractions  Cardiac: +S1/S2; RRR; no M/R/G; PMI non-displaced  Gastrointestinal: soft, NT/ND; no rebound or guarding; +BSx4  Extremities: WWP, no clubbing or cyanosis; no peripheral edema  Musculoskeletal: NROM x4; no joint swelling, tenderness or erythema  Vascular: 2+ radial, DP/PT pulses B/L  Dermatologic: skin warm, dry and intact; no rashes, wounds, or scars  Lymphatic: no submandibular or cervical LAD  Neurologic: AAOx3; CNII-XII grossly intact; no focal deficits        I&O's Summary      LABS:	 	                        12.9   12.91 )-----------( 318      ( 2023 09:35 )             38.9         139  |  106  |  14  ----------------------------<  83  4.6   |  21<L>  |  0.64    Ca    9.5      2023 09:35    TPro  7.1  /  Alb  3.4  /  TBili  0.3  /  DBili  x   /  AST  22  /  ALT  41  /  AlkPhos  106  07-25        proBNP:   Lipid Profile:   HgA1c:   TSH:     TELEMETRY: 	    ECG:  	  RADIOLOGY:   ECHO:  STRESS:  CATH:   **Incomplete Note**  Cardiology Consult      HPI: 28yF  pmhx cHTN (hospitalized for Hypertensive emergnecy) presents to [facility] antepartum for placental monitoring in the setting of late decelerations, cardiology consulted for cHTN mgmt. She had a hypertensive emergency 3-4 years ago and went to [facility] with admission to MICU for nicardipine gtt. Prior to her pregnancy, she was on labetalol 100mg TID and another drug starting with a "T"- the medication she does not remember was d/c and replaced w/nifedipine 30mg xl qd by her PCP. During her current and first pregnancy, she was found to have an SBP above 160 at the end of  by her OB and was sent to [facility] for which she had her labetalol increased to 300mg TID and kept on nifedipine 30mg XL qd. On ROS, pt denies: fevers, chills, myalgias, dizziness, weakness, HA, dysphagia, dysarthria, changes in vision, CP/chest discomfort, palpitations, SOB, cough, PND, orthopnea, N/V/D/C, abdominal pain, dysuria, urinary urgency/increased frequency, changes in bowel movements, melena, hematochezia, LE edema, joint pain, or unintentional weightloss. ROS otherwise negative.        Pt seen and examined at bedside, NAD, denies chest pain/pressure/discomfort. ROS negative.      Review of Systems:  CONSTITUTIONAL:  No weight loss, fever, chills, weakness or fatigue.  HEENT:  Eyes:  No visual loss, blurred vision, double vision or yellow sclerae. Ears, Nose, Throat:  No hearing loss, sneezing, congestion, runny nose or sore throat.  SKIN:  No rash or itching.  CARDIOVASCULAR:  No chest pain, chest pressure or chest discomfort. No palpitations or edema.  RESPIRATORY:  No shortness of breath, cough or sputum.  GASTROINTESTINAL:  No anorexia, nausea, vomiting or diarrhea. No abdominal pain or blood.  GENITOURINARY: No Burning on urination.   NEUROLOGICAL:  see HPI  MUSCULOSKELETAL:  No muscle, back pain, joint pain or stiffness.  HEMATOLOGIC:  No anemia, bleeding or bruising.  LYMPHATICS:  No enlarged nodes. No history of splenectomy.  PSYCHIATRIC:  No history of depression or anxiety.  ENDOCRINOLOGIC:  No reports of sweating, cold or heat intolerance. No polyuria or polydipsia.  ALLERGIES:  No history of asthma, hives, eczema or rhinitis.    PAST MEDICAL & SURGICAL HISTORY:  3-5 years ago, [facility] admission for hypertensive emergency requiring cardine gtt    ALLERGIES: 	  sulfa drugs (Rash)          MEDICATIONS:  aspirin  chewable 162 milliGRAM(s) Oral daily  betamethasone Injectable 12 milliGRAM(s) IntraMuscular every 24 hours  labetalol 300 milliGRAM(s) Oral every 8 hours  NIFEdipine XL 30 milliGRAM(s) Oral every 24 hours  prenatal multivitamin 1 Tablet(s) Oral daily      T(C): 36.8 (23 @ 06:00), Max: 36.9 (23 @ 21:43)  HR: 69 (23 @ 07:02) (69 - 100)  BP: 129/79 (23 @ 07:02) (100/62 - 150/90)  RR: 18 (23 @ 06:00) (16 - 18)  SpO2: 98% (23 @ 07:02) (96% - 100%)      PHYSICAL EXAM:    Constitutional: resting comfortably in bed; NAD  HEENT: NC/AT, PERRL, EOMI, anicteric sclera, no nasal discharge; uvula midline, no oropharyngeal erythema or exudates; MMM  Neck: supple; no thyromegaly, JVP <8 cm H20, JVD -  Respiratory: CTA B/L; no W/R/R, no retractions  Cardiac: +S1/S2; RRR; no M/R/G; PMI non-displaced  Gastrointestinal: soft, NT/ND; no rebound or guarding; +BSx4  Extremities: WWP, no clubbing or cyanosis; no peripheral edema  Musculoskeletal: NROM x4; no joint swelling, tenderness or erythema  Vascular: 2+ radial, DP/PT pulses B/L  Dermatologic: skin warm, dry and intact; no rashes, wounds, or scars  Lymphatic: no submandibular or cervical LAD  Neurologic: AAOx3; CNII-XII grossly intact; no focal deficits        I&O's Summary      LABS:	 	                        12.9   12.91 )-----------( 318      ( 2023 09:35 )             38.9         139  |  106  |  14  ----------------------------<  83  4.6   |  21<L>  |  0.64    Ca    9.5      2023 09:35    TPro  7.1  /  Alb  3.4  /  TBili  0.3  /  DBili  x   /  AST  22  /  ALT  41  /  AlkPhos  106  07-25        proBNP:   Lipid Profile:   HgA1c:   TSH:     TELEMETRY: 	    ECG:  	  RADIOLOGY:   ECHO:  STRESS:  CATH:   **Incomplete Note**  Cardiology Consult      HPI: 28yF  pmhx cHTN (hospitalized for Hypertensive emergnecy) presents to [facility] antepartum for placental monitoring in the setting of late decelerations, cardiology consulted for cHTN mgmt. She had a hypertensive emergency 3-4 years ago and went to [facility] with admission to MICU for nicardipine gtt. Prior to her pregnancy, she was on labetalol 100mg TID and another drug starting with a "T"- the medication she does not remember was d/c and replaced w/nifedipine 30mg xl qd by her PCP. During her current and first pregnancy, she was found to have an SBP above 160 at the end of  by her OB and was sent to [facility] for which she had her labetalol increased to 300mg TID and kept on nifedipine 30mg XL qd. On ROS, pt denies: fevers, chills, myalgias, dizziness, weakness, HA, dysphagia, dysarthria, changes in vision, CP/chest discomfort, palpitations, SOB, cough, PND, orthopnea, N/V/D/C, abdominal pain, dysuria, urinary urgency/increased frequency, changes in bowel movements, melena, hematochezia, LE edema, joint pain, or unintentional weightloss. ROS otherwise negative.        Pt seen and examined at bedside, NAD, denies chest pain/pressure/discomfort. ROS negative.      Review of Systems:  CONSTITUTIONAL:  No weight loss, fever, chills, weakness or fatigue.  HEENT:  Eyes:  No visual loss, blurred vision, double vision or yellow sclerae. Ears, Nose, Throat:  No hearing loss, sneezing, congestion, runny nose or sore throat.  SKIN:  No rash or itching.  CARDIOVASCULAR:  No chest pain, chest pressure or chest discomfort. No palpitations or edema.  RESPIRATORY:  No shortness of breath, cough or sputum.  GASTROINTESTINAL:  No anorexia, nausea, vomiting or diarrhea. No abdominal pain or blood.  GENITOURINARY: No Burning on urination.   NEUROLOGICAL:  see HPI  MUSCULOSKELETAL:  No muscle, back pain, joint pain or stiffness.  HEMATOLOGIC:  No anemia, bleeding or bruising.  LYMPHATICS:  No enlarged nodes. No history of splenectomy.  PSYCHIATRIC:  No history of depression or anxiety.  ENDOCRINOLOGIC:  No reports of sweating, cold or heat intolerance. No polyuria or polydipsia.  ALLERGIES:  No history of asthma, hives, eczema or rhinitis.    PAST MEDICAL & SURGICAL HISTORY:  3-5 years ago, [facility] admission for hypertensive emergency requiring cardine gtt    ALLERGIES: 	  sulfa drugs (Rash)          MEDICATIONS:  aspirin  chewable 162 milliGRAM(s) Oral daily  betamethasone Injectable 12 milliGRAM(s) IntraMuscular every 24 hours  labetalol 300 milliGRAM(s) Oral every 8 hours  NIFEdipine XL 30 milliGRAM(s) Oral every 24 hours  prenatal multivitamin 1 Tablet(s) Oral daily      T(C): 36.8 (23 @ 06:00), Max: 36.9 (23 @ 21:43)  HR: 69 (23 @ 07:02) (69 - 100)  BP: 129/79 (23 @ 07:02) (100/62 - 150/90)  RR: 18 (23 @ 06:00) (16 - 18)  SpO2: 98% (23 @ 07:02) (96% - 100%)      PHYSICAL EXAM:    Constitutional: resting comfortably in bed; NAD  HEENT: NC/AT, PERRL, EOMI, anicteric sclera, no nasal discharge; uvula midline, no oropharyngeal erythema or exudates; MMM  Neck: supple; no thyromegaly, JVP <8 cm H20, JVD -  Respiratory: CTA B/L; no W/R/R, no retractions  Cardiac: +S1/S2; RRR; no M/R/G; PMI non-displaced  Gastrointestinal: soft, NT/ND; no rebound or guarding; +BSx4  Extremities: WWP, no clubbing or cyanosis; no peripheral edema  Musculoskeletal: NROM x4; no joint swelling, tenderness or erythema  Vascular: 2+ radial, DP/PT pulses B/L  Dermatologic: skin warm, dry and intact; no rashes, wounds, or scars  Lymphatic: no submandibular or cervical LAD  Neurologic: AAOx3; CNII-XII grossly intact; no focal deficits        I&O's Summary      LABS:	 	                        12.9   12.91 )-----------( 318      ( 2023 09:35 )             38.9         139  |  106  |  14  ----------------------------<  83  4.6   |  21<L>  |  0.64    Ca    9.5      2023 09:35    TPro  7.1  /  Alb  3.4  /  TBili  0.3  /  DBili  x   /  AST  22  /  ALT  41  /  AlkPhos  106  07-25        proBNP:   Lipid Profile:   HgA1c:   TSH:     TELEMETRY: 	    ECG:  	  RADIOLOGY:   ECHO:  STRESS:  CATH:   Cardiology Consult      HPI: 28yF  pmhx cHTN (hospitalized for Hypertensive emergnecy) presents to [facility] antepartum for placental monitoring in the setting of late decelerations, cardiology consulted for cHTN mgmt. She had a hypertensive emergency 3-4 years ago and went to [facility] with admission to MICU for nicardipine gtt. Prior to her pregnancy, she was on labetalol 100mg TID and another drug starting with a "T"- the medication she does not remember was d/c and replaced w/nifedipine 30mg xl qd by her PCP. During her current and first pregnancy, she was found to have an SBP above 160 at the end of  by her OB and was sent to [facility] for which she had her labetalol increased to 300mg TID and kept on nifedipine 30mg XL qd. On ROS, pt denies: fevers, chills, myalgias, dizziness, weakness, HA, dysphagia, dysarthria, changes in vision, CP/chest discomfort, palpitations, SOB, cough, PND, orthopnea, N/V/D/C, abdominal pain, dysuria, urinary urgency/increased frequency, changes in bowel movements, melena, hematochezia, LE edema, joint pain, or unintentional weightloss. ROS otherwise negative.    Pt seen and examined at bedside, NAD, denies chest pain/pressure/discomfort. ROS negative.      Review of Systems:  CONSTITUTIONAL:  No weight loss, fever, chills, weakness or fatigue.  HEENT:  Eyes:  No visual loss, blurred vision, double vision or yellow sclerae. Ears, Nose, Throat:  No hearing loss, sneezing, congestion, runny nose or sore throat.  SKIN:  No rash or itching.  CARDIOVASCULAR:  No chest pain, chest pressure or chest discomfort. No palpitations or edema.  RESPIRATORY:  No shortness of breath, cough or sputum.  GASTROINTESTINAL:  No anorexia, nausea, vomiting or diarrhea. No abdominal pain or blood.  GENITOURINARY: No Burning on urination.   NEUROLOGICAL:  see HPI  MUSCULOSKELETAL:  No muscle, back pain, joint pain or stiffness.  HEMATOLOGIC:  No anemia, bleeding or bruising.  LYMPHATICS:  No enlarged nodes. No history of splenectomy.  PSYCHIATRIC:  No history of depression or anxiety.  ENDOCRINOLOGIC:  No reports of sweating, cold or heat intolerance. No polyuria or polydipsia.  ALLERGIES:  No history of asthma, hives, eczema or rhinitis.    PAST MEDICAL & SURGICAL HISTORY:  3-5 years ago, [facility] admission for hypertensive emergency requiring cardine gtt    ALLERGIES: 	  sulfa drugs (Rash)          MEDICATIONS:  aspirin  chewable 162 milliGRAM(s) Oral daily  betamethasone Injectable 12 milliGRAM(s) IntraMuscular every 24 hours  labetalol 300 milliGRAM(s) Oral every 8 hours  NIFEdipine XL 30 milliGRAM(s) Oral every 24 hours  prenatal multivitamin 1 Tablet(s) Oral daily      T(C): 36.8 (23 @ 06:00), Max: 36.9 (23 @ 21:43)  HR: 69 (23 @ 07:02) (69 - 100)  BP: 129/79 (23 @ 07:02) (100/62 - 150/90)  RR: 18 (23 @ 06:00) (16 - 18)  SpO2: 98% (23 @ 07:02) (96% - 100%)      PHYSICAL EXAM:    Constitutional: resting comfortably in bed; NAD  HEENT: NC/AT, PERRL, EOMI, anicteric sclera, no nasal discharge; uvula midline, no oropharyngeal erythema or exudates; MMM  Neck: supple; no thyromegaly, JVP <8 cm H20, JVD -  Respiratory: CTA B/L; no W/R/R, no retractions  Cardiac: +S1/S2; RRR; no M/R/G; PMI non-displaced  Gastrointestinal: soft, NT/ND; no rebound or guarding; +BSx4  Extremities: WWP, no clubbing or cyanosis; no peripheral edema  Musculoskeletal: NROM x4; no joint swelling, tenderness or erythema  Vascular: 2+ radial, DP/PT pulses B/L  Dermatologic: skin warm, dry and intact; no rashes, wounds, or scars  Lymphatic: no submandibular or cervical LAD  Neurologic: AAOx3; CNII-XII grossly intact; no focal deficits        I&O's Summary      LABS:	 	                        12.9   12.91 )-----------( 318      ( 2023 09:35 )             38.9     -    139  |  106  |  14  ----------------------------<  83  4.6   |  21<L>  |  0.64    Ca    9.5      2023 09:35    TPro  7.1  /  Alb  3.4  /  TBili  0.3  /  DBili  x   /  AST  22  /  ALT  41  /  AlkPhos  106  07-25        proBNP:   Lipid Profile:   HgA1c:   TSH:     TELEMETRY: 	    ECG:  	  RADIOLOGY:   ECHO:  STRESS:  CATH:   Cardiology Consult      HPI: 28yF  pmhx cHTN (hospitalized for Hypertensive emergnecy) presents to [facility] antepartum for placental monitoring in the setting of late decelerations, cardiology consulted for cHTN mgmt. She had a hypertensive emergency 3-4 years ago and went to [facility] with admission to MICU for nicardipine gtt. Prior to her pregnancy, she was on labetalol 100mg TID and another drug starting with a "T"- the medication she does not remember was d/c and replaced w/nifedipine 30mg xl qd by her PCP. During her current and first pregnancy, she was found to have an SBP above 160 at the end of  by her OB and was sent to [facility] for which she had her labetalol increased to 300mg TID and kept on nifedipine 30mg XL qd. On ROS, pt denies: fevers, chills, myalgias, dizziness, weakness, HA, dysphagia, dysarthria, changes in vision, CP/chest discomfort, palpitations, SOB, cough, PND, orthopnea, N/V/D/C, abdominal pain, dysuria, urinary urgency/increased frequency, changes in bowel movements, melena, hematochezia, LE edema, joint pain, or unintentional weightloss. ROS otherwise negative.    Pt seen and examined at bedside, NAD, denies chest pain/pressure/discomfort. ROS negative.      Review of Systems:  CONSTITUTIONAL:  No weight loss, fever, chills, weakness or fatigue.  HEENT:  Eyes:  No visual loss, blurred vision, double vision or yellow sclerae. Ears, Nose, Throat:  No hearing loss, sneezing, congestion, runny nose or sore throat.  SKIN:  No rash or itching.  CARDIOVASCULAR:  No chest pain, chest pressure or chest discomfort. No palpitations or edema.  RESPIRATORY:  No shortness of breath, cough or sputum.  GASTROINTESTINAL:  No anorexia, nausea, vomiting or diarrhea. No abdominal pain or blood.  GENITOURINARY: No Burning on urination.   NEUROLOGICAL:  see HPI  MUSCULOSKELETAL:  No muscle, back pain, joint pain or stiffness.  HEMATOLOGIC:  No anemia, bleeding or bruising.  LYMPHATICS:  No enlarged nodes. No history of splenectomy.  PSYCHIATRIC:  No history of depression or anxiety.  ENDOCRINOLOGIC:  No reports of sweating, cold or heat intolerance. No polyuria or polydipsia.  ALLERGIES:  No history of asthma, hives, eczema or rhinitis.    PAST MEDICAL & SURGICAL HISTORY:  3-5 years ago, [facility] admission for hypertensive emergency requiring cardine gtt    ALLERGIES: 	  sulfa drugs (Rash)          MEDICATIONS:  aspirin  chewable 162 milliGRAM(s) Oral daily  betamethasone Injectable 12 milliGRAM(s) IntraMuscular every 24 hours  labetalol 300 milliGRAM(s) Oral every 8 hours  NIFEdipine XL 30 milliGRAM(s) Oral every 24 hours  prenatal multivitamin 1 Tablet(s) Oral daily      T(C): 36.8 (23 @ 06:00), Max: 36.9 (23 @ 21:43)  HR: 69 (23 @ 07:02) (69 - 100)  BP: 129/79 (23 @ 07:02) (100/62 - 150/90)  RR: 18 (23 @ 06:00) (16 - 18)  SpO2: 98% (23 @ 07:02) (96% - 100%)      PHYSICAL EXAM:    Constitutional: resting comfortably in bed; NAD  HEENT: NC/AT, PERRL, EOMI, anicteric sclera, no nasal discharge; uvula midline, no oropharyngeal erythema or exudates; MMM  Neck: supple; no thyromegaly, JVP <8 cm H20, JVD -  Respiratory: CTA B/L; no W/R/R, no retractions  Cardiac: +S1/S2; RRR; no M/R/G; PMI non-displaced  Gastrointestinal: soft, NT/ND; no rebound or guarding; +BSx4  Extremities: WWP, no clubbing or cyanosis; no peripheral edema  Musculoskeletal: NROM x4; no joint swelling, tenderness or erythema  Vascular: 2+ radial, DP/PT pulses B/L  Dermatologic: skin warm, dry and intact; no rashes, wounds, or scars  Lymphatic: no submandibular or cervical LAD  Neurologic: AAOx3; CNII-XII grossly intact; no focal deficits        I&O's Summary      LABS:	 	                        12.9   12.91 )-----------( 318      ( 2023 09:35 )             38.9     -    139  |  106  |  14  ----------------------------<  83  4.6   |  21<L>  |  0.64    Ca    9.5      2023 09:35    TPro  7.1  /  Alb  3.4  /  TBili  0.3  /  DBili  x   /  AST  22  /  ALT  41  /  AlkPhos  106  07-25        proBNP:   Lipid Profile:   HgA1c:   TSH:     TELEMETRY: 	    ECG:  	  RADIOLOGY:   ECHO:  STRESS:  CATH:   Cardiology Consult      HPI: 28yF  pmhx cHTN (hospitalized for Hypertensive emergnecy) presents to [facility] antepartum for placental monitoring in the setting of late decelerations, cardiology consulted for cHTN mgmt. She had a hypertensive emergency 3-4 years ago and [facility] [facility] for nicardipine gtt. Prior to her pregnancy, she was on labetalol 100mg TID and another drug starting with a "T"- the medication she does not remember was d/c and replaced w/nifedipine 30mg xl qd by her PCP. During her current and first pregnancy, she was found to have an SBP above 160 at the end of  by her OB and was sent to [facility] for which she had her labetalol increased to 300mg TID and kept on nifedipine 30mg XL qd. On ROS, pt denies: fevers, chills, myalgias, dizziness, weakness, HA, dysphagia, dysarthria, changes in vision, CP/chest discomfort, palpitations, SOB, cough, PND, orthopnea, N/V/D/C, abdominal pain, dysuria, urinary urgency/increased frequency, changes in bowel movements, melena, hematochezia, LE edema, joint pain, or unintentional weightloss. ROS otherwise negative.    Pt seen and examined at bedside, NAD, denies chest pain/pressure/discomfort. ROS negative.      Review of Systems:  CONSTITUTIONAL:  No weight loss, fever, chills, weakness or fatigue.  HEENT:  Eyes:  No visual loss, blurred vision, double vision or yellow sclerae. Ears, Nose, Throat:  No hearing loss, sneezing, congestion, runny nose or sore throat.  SKIN:  No rash or itching.  CARDIOVASCULAR:  No chest pain, chest pressure or chest discomfort. No palpitations or edema.  RESPIRATORY:  No shortness of breath, cough or sputum.  GASTROINTESTINAL:  No anorexia, nausea, vomiting or diarrhea. No abdominal pain or blood.  GENITOURINARY: No Burning on urination.   NEUROLOGICAL:  see HPI  MUSCULOSKELETAL:  No muscle, back pain, joint pain or stiffness.  HEMATOLOGIC:  No anemia, bleeding or bruising.  LYMPHATICS:  No enlarged nodes. No history of splenectomy.  PSYCHIATRIC:  No history of depression or anxiety.  ENDOCRINOLOGIC:  No reports of sweating, cold or heat intolerance. No polyuria or polydipsia.  ALLERGIES:  No history of asthma, hives, eczema or rhinitis.    PAST MEDICAL & SURGICAL HISTORY:  3-5 years ago, [facility] admission for hypertensive emergency requiring cardine gtt    ALLERGIES: 	  sulfa drugs (Rash)          MEDICATIONS:  aspirin  chewable 162 milliGRAM(s) Oral daily  betamethasone Injectable 12 milliGRAM(s) IntraMuscular every 24 hours  labetalol 300 milliGRAM(s) Oral every 8 hours  NIFEdipine XL 30 milliGRAM(s) Oral every 24 hours  prenatal multivitamin 1 Tablet(s) Oral daily      T(C): 36.8 (23 @ 06:00), Max: 36.9 (23 @ 21:43)  HR: 69 (23 @ 07:02) (69 - 100)  BP: 129/79 (23 @ 07:02) (100/62 - 150/90)  RR: 18 (23 @ 06:00) (16 - 18)  SpO2: 98% (23 @ 07:02) (96% - 100%)      PHYSICAL EXAM:    Constitutional: resting comfortably in bed; NAD  HEENT: NC/AT, PERRL, EOMI, anicteric sclera, no nasal discharge; uvula midline, no oropharyngeal erythema or exudates; MMM  Neck: supple; no thyromegaly, JVP <8 cm H20, JVD -  Respiratory: CTA B/L; no W/R/R, no retractions  Cardiac: +S1/S2; RRR; no M/R/G; PMI non-displaced  Gastrointestinal: soft, NT/ND; no rebound or guarding; +BSx4  Extremities: WWP, no clubbing or cyanosis; no peripheral edema  Musculoskeletal: NROM x4; no joint swelling, tenderness or erythema  Vascular: 2+ radial, DP/PT pulses B/L  Dermatologic: skin warm, dry and intact; no rashes, wounds, or scars  Lymphatic: no submandibular or cervical LAD  Neurologic: AAOx3; CNII-XII grossly intact; no focal deficits        I&O's Summary      LABS:	 	                        12.9   12.91 )-----------( 318      ( 2023 09:35 )             38.9     -    139  |  106  |  14  ----------------------------<  83  4.6   |  21<L>  |  0.64    Ca    9.5      2023 09:35    TPro  7.1  /  Alb  3.4  /  TBili  0.3  /  DBili  x   /  AST  22  /  ALT  41  /  AlkPhos  106  07-25        proBNP:   Lipid Profile:   HgA1c:   TSH:     TELEMETRY: 	    ECG:  	  RADIOLOGY:   ECHO:  STRESS:  CATH:   Cardiology Consult      HPI: 28yF  pmhx cHTN (hospitalized for Hypertensive emergency presents to [facility] antepartum for placental monitoring in the setting of late decelerations, cardiology consulted for cHTN mgmt. She had a hypertensive emergency 3-4 years ago and went to [facility] with admission to MICU for nicardipine gtt. Prior to her pregnancy, she was on labetalol 100mg TID and another drug starting with a "T"- the medication she does not remember was d/c and replaced w/nifedipine 30mg xl qd by her PCP. During her current and first pregnancy, she was found to have an SBP above 160 at the end of  by her OB and was sent to [facility] for which she had her labetalol increased to 300mg TID and kept on nifedipine 30mg XL qd.     On ROS, pt denies: fevers, chills, myalgias, dizziness, weakness, HA, dysphagia, dysarthria, changes in vision, CP/chest discomfort, palpitations, SOB, cough, PND, orthopnea, N/V/D/C, abdominal pain, dysuria, urinary urgency/increased frequency, changes in bowel movements, melena, hematochezia, LE edema, joint pain, or unintentional weightloss. ROS otherwise negative.    Pt seen and examined at bedside, NAD, denies chest pain/pressure/discomfort. ROS negative.      Review of Systems:  CONSTITUTIONAL:  No weight loss, fever, chills, weakness or fatigue.  HEENT:  Eyes:  No visual loss, blurred vision, double vision or yellow sclerae. Ears, Nose, Throat:  No hearing loss, sneezing, congestion, runny nose or sore throat.  SKIN:  No rash or itching.  CARDIOVASCULAR:  No chest pain, chest pressure or chest discomfort. No palpitations or edema.  RESPIRATORY:  No shortness of breath, cough or sputum.  GASTROINTESTINAL:  No anorexia, nausea, vomiting or diarrhea. No abdominal pain or blood.  GENITOURINARY: No Burning on urination.   NEUROLOGICAL:  see HPI  MUSCULOSKELETAL:  No muscle, back pain, joint pain or stiffness.  HEMATOLOGIC:  No anemia, bleeding or bruising.  LYMPHATICS:  No enlarged nodes. No history of splenectomy.  PSYCHIATRIC:  No history of depression or anxiety.  ENDOCRINOLOGIC:  No reports of sweating, cold or heat intolerance. No polyuria or polydipsia.  ALLERGIES:  No history of asthma, hives, eczema or rhinitis.    PAST MEDICAL & SURGICAL HISTORY:  3-5 years ago, [facility] admission for hypertensive emergency requiring cardine gtt    ALLERGIES: 	  sulfa drugs (Rash)          MEDICATIONS:  aspirin  chewable 162 milliGRAM(s) Oral daily  betamethasone Injectable 12 milliGRAM(s) IntraMuscular every 24 hours  labetalol 300 milliGRAM(s) Oral every 8 hours  NIFEdipine XL 30 milliGRAM(s) Oral every 24 hours  prenatal multivitamin 1 Tablet(s) Oral daily      T(C): 36.8 (23 @ 06:00), Max: 36.9 (23 @ 21:43)  HR: 69 (23 @ 07:02) (69 - 100)  BP: 129/79 (23 @ 07:02) (100/62 - 150/90)  RR: 18 (23 @ 06:00) (16 - 18)  SpO2: 98% (23 @ 07:02) (96% - 100%)      PHYSICAL EXAM:    Constitutional: resting comfortably in bed; NAD  HEENT: NC/AT, PERRL, EOMI, anicteric sclera, no nasal discharge; uvula midline, no oropharyngeal erythema or exudates; MMM  Neck: supple; no thyromegaly, JVP <8 cm H20, JVD -  Respiratory: CTA B/L; no W/R/R, no retractions  Cardiac: +S1/S2; RRR; no M/R/G; PMI non-displaced  Gastrointestinal: soft, NT/ND; no rebound or guarding; +BSx4  Extremities: WWP, no clubbing or cyanosis; no peripheral edema  Musculoskeletal: NROM x4; no joint swelling, tenderness or erythema  Vascular: 2+ radial, DP/PT pulses B/L  Dermatologic: skin warm, dry and intact; no rashes, wounds, or scars  Lymphatic: no submandibular or cervical LAD  Neurologic: AAOx3; CNII-XII grossly intact; no focal deficits        I&O's Summary      LABS:	 	                        12.9   12.91 )-----------( 318      ( 2023 09:35 )             38.9         139  |  106  |  14  ----------------------------<  83  4.6   |  21<L>  |  0.64    Ca    9.5      2023 09:35    TPro  7.1  /  Alb  3.4  /  TBili  0.3  /  DBili  x   /  AST  22  /  ALT  41  /  AlkPhos  106  07-        proBNP:   Lipid Profile:   HgA1c:   TSH:     TELEMETRY: 	    ECG:  	  RADIOLOGY:   ECHO:  STRESS:  CATH:   Cardiology Consult      HPI: 28yF  pmhx cHTN (hospitalized for Hypertensive emergency presents to [facility] antepartum for placental monitoring in the setting of late decelerations, cardiology consulted for cHTN mgmt. She had a hypertensive emergency 3-4 years ago and went to [facility] with admission to MICU for nicardipine gtt. Prior to her pregnancy, she was on labetalol 100mg TID and another drug starting with a "T"- the medication she does not remember was d/c and replaced w/nifedipine 30mg xl qd by her PCP. During her current and first pregnancy, she was found to have an SBP above 160 at the end of  by her OB and was sent to [facility] for which she had her labetalol increased to 300mg TID and kept on nifedipine 30mg XL qd.     On ROS, pt denies: fevers, chills, myalgias, dizziness, weakness, HA, dysphagia, dysarthria, changes in vision, CP/chest discomfort, palpitations, SOB, cough, PND, orthopnea, N/V/D/C, abdominal pain, dysuria, urinary urgency/increased frequency, changes in bowel movements, melena, hematochezia, LE edema, joint pain, or unintentional weightloss. ROS otherwise negative.    Pt seen and examined at bedside, NAD, denies chest pain/pressure/discomfort. ROS negative.      Review of Systems:  CONSTITUTIONAL:  No weight loss, fever, chills, weakness or fatigue.  HEENT:  Eyes:  No visual loss, blurred vision, double vision or yellow sclerae. Ears, Nose, Throat:  No hearing loss, sneezing, congestion, runny nose or sore throat.  SKIN:  No rash or itching.  CARDIOVASCULAR:  No chest pain, chest pressure or chest discomfort. No palpitations or edema.  RESPIRATORY:  No shortness of breath, cough or sputum.  GASTROINTESTINAL:  No anorexia, nausea, vomiting or diarrhea. No abdominal pain or blood.  GENITOURINARY: No Burning on urination.   NEUROLOGICAL:  see HPI  MUSCULOSKELETAL:  No muscle, back pain, joint pain or stiffness.  HEMATOLOGIC:  No anemia, bleeding or bruising.  LYMPHATICS:  No enlarged nodes. No history of splenectomy.  PSYCHIATRIC:  No history of depression or anxiety.  ENDOCRINOLOGIC:  No reports of sweating, cold or heat intolerance. No polyuria or polydipsia.  ALLERGIES:  No history of asthma, hives, eczema or rhinitis.    PAST MEDICAL & SURGICAL HISTORY:  3-5 years ago, [facility] admission for hypertensive emergency requiring cardine gtt    ALLERGIES: 	  sulfa drugs (Rash)          MEDICATIONS:  aspirin  chewable 162 milliGRAM(s) Oral daily  betamethasone Injectable 12 milliGRAM(s) IntraMuscular every 24 hours  labetalol 300 milliGRAM(s) Oral every 8 hours  NIFEdipine XL 30 milliGRAM(s) Oral every 24 hours  prenatal multivitamin 1 Tablet(s) Oral daily      T(C): 36.8 (23 @ 06:00), Max: 36.9 (23 @ 21:43)  HR: 69 (23 @ 07:02) (69 - 100)  BP: 129/79 (23 @ 07:02) (100/62 - 150/90)  RR: 18 (23 @ 06:00) (16 - 18)  SpO2: 98% (23 @ 07:02) (96% - 100%)      PHYSICAL EXAM:    Constitutional: resting comfortably in bed; NAD  HEENT: NC/AT, PERRL, EOMI, anicteric sclera, no nasal discharge; uvula midline, no oropharyngeal erythema or exudates; MMM  Neck: supple; no thyromegaly, JVP <8 cm H20, JVD -  Respiratory: CTA B/L; no W/R/R, no retractions  Cardiac: +S1/S2; RRR; no M/R/G; PMI non-displaced  Gastrointestinal: soft, NT/ND; no rebound or guarding; +BSx4  Extremities: WWP, no clubbing or cyanosis; no peripheral edema  Musculoskeletal: NROM x4; no joint swelling, tenderness or erythema  Vascular: 2+ radial, DP/PT pulses B/L  Dermatologic: skin warm, dry and intact; no rashes, wounds, or scars  Lymphatic: no submandibular or cervical LAD  Neurologic: AAOx3; CNII-XII grossly intact; no focal deficits        I&O's Summary      LABS:	 	                        12.9   12.91 )-----------( 318      ( 2023 09:35 )             38.9         139  |  106  |  14  ----------------------------<  83  4.6   |  21<L>  |  0.64    Ca    9.5      2023 09:35    TPro  7.1  /  Alb  3.4  /  TBili  0.3  /  DBili  x   /  AST  22  /  ALT  41  /  AlkPhos  106  07-        proBNP:   Lipid Profile:   HgA1c:   TSH:     TELEMETRY: 	    ECG:  	  RADIOLOGY:   ECHO:  STRESS:  CATH:   Cardiology Consult      HPI: 28yF  pmhx cHTN (hospitalized for Hypertensive emergency presents to [facility] antepartum for placental monitoring in the setting of late decelerations, cardiology consulted for cHTN mgmt. She had a hypertensive emergency 3-4 years ago and went to [facility] with admission to MICU for nicardipine gtt. Prior to her pregnancy, she was on labetalol 100mg TID and another drug starting with a "T"- the medication she does not remember was d/c and replaced w/nifedipine 30mg xl qd by her PCP. During her current and first pregnancy, she was found to have an SBP above 160 at the end of  by her OB and was sent to [facility] for which she had her labetalol increased to 300mg TID and kept on nifedipine 30mg XL qd.     On ROS, pt denies: fevers, chills, myalgias, dizziness, weakness, HA, dysphagia, dysarthria, changes in vision, CP/chest discomfort, palpitations, SOB, cough, PND, orthopnea, N/V/D/C, abdominal pain, dysuria, urinary urgency/increased frequency, changes in bowel movements, melena, hematochezia, LE edema, joint pain, or unintentional weightloss. ROS otherwise negative.    Pt seen and examined at bedside, NAD, denies chest pain/pressure/discomfort. ROS negative.      Review of Systems:  CONSTITUTIONAL:  No weight loss, fever, chills, weakness or fatigue.  HEENT:  Eyes:  No visual loss, blurred vision, double vision or yellow sclerae. Ears, Nose, Throat:  No hearing loss, sneezing, congestion, runny nose or sore throat.  SKIN:  No rash or itching.  CARDIOVASCULAR:  No chest pain, chest pressure or chest discomfort. No palpitations or edema.  RESPIRATORY:  No shortness of breath, cough or sputum.  GASTROINTESTINAL:  No anorexia, nausea, vomiting or diarrhea. No abdominal pain or blood.  GENITOURINARY: No Burning on urination.   NEUROLOGICAL:  see HPI  MUSCULOSKELETAL:  No muscle, back pain, joint pain or stiffness.  HEMATOLOGIC:  No anemia, bleeding or bruising.  LYMPHATICS:  No enlarged nodes. No history of splenectomy.  PSYCHIATRIC:  No history of depression or anxiety.  ENDOCRINOLOGIC:  No reports of sweating, cold or heat intolerance. No polyuria or polydipsia.  ALLERGIES:  No history of asthma, hives, eczema or rhinitis.    PAST MEDICAL & SURGICAL HISTORY:  3-5 years ago, [facility] admission for hypertensive emergency requiring cardine gtt    ALLERGIES: 	  sulfa drugs (Rash)          MEDICATIONS:  aspirin  chewable 162 milliGRAM(s) Oral daily  betamethasone Injectable 12 milliGRAM(s) IntraMuscular every 24 hours  labetalol 300 milliGRAM(s) Oral every 8 hours  NIFEdipine XL 30 milliGRAM(s) Oral every 24 hours  prenatal multivitamin 1 Tablet(s) Oral daily      T(C): 36.8 (23 @ 06:00), Max: 36.9 (23 @ 21:43)  HR: 69 (23 @ 07:02) (69 - 100)  BP: 129/79 (23 @ 07:02) (100/62 - 150/90)  RR: 18 (23 @ 06:00) (16 - 18)  SpO2: 98% (23 @ 07:02) (96% - 100%)      PHYSICAL EXAM:    Constitutional: resting comfortably in bed; NAD  HEENT: NC/AT, PERRL, EOMI, anicteric sclera, no nasal discharge; uvula midline, no oropharyngeal erythema or exudates; MMM  Neck: supple; no thyromegaly, JVP <8 cm H20, JVD -  Respiratory: CTA B/L; no W/R/R, no retractions  Cardiac: +S1/S2; RRR; no M/R/G; PMI non-displaced  Gastrointestinal: soft, NT/ND; no rebound or guarding; +BSx4  Extremities: WWP, no clubbing or cyanosis; no peripheral edema  Musculoskeletal: NROM x4; no joint swelling, tenderness or erythema  Vascular: 2+ radial, DP/PT pulses B/L  Dermatologic: skin warm, dry and intact; no rashes, wounds, or scars  Lymphatic: no submandibular or cervical LAD  Neurologic: AAOx3; CNII-XII grossly intact; no focal deficits        I&O's Summary      LABS:	 	                        12.9   12.91 )-----------( 318      ( 2023 09:35 )             38.9         139  |  106  |  14  ----------------------------<  83  4.6   |  21<L>  |  0.64    Ca    9.5      2023 09:35    TPro  7.1  /  Alb  3.4  /  TBili  0.3  /  DBili  x   /  AST  22  /  ALT  41  /  AlkPhos  106  07-        proBNP:   Lipid Profile:   HgA1c:   TSH:     TELEMETRY: 	    ECG:  	  RADIOLOGY:   ECHO:  STRESS:  CATH:

## 2023-07-27 PROCEDURE — 88307 TISSUE EXAM BY PATHOLOGIST: CPT | Mod: 26

## 2023-07-27 RX ORDER — SIMETHICONE 80 MG/1
80 TABLET, CHEWABLE ORAL EVERY 4 HOURS
Refills: 0 | Status: DISCONTINUED | OUTPATIENT
Start: 2023-07-27 | End: 2023-07-31

## 2023-07-27 RX ORDER — FENTANYL/BUPIVACAINE/NS/PF 2MCG/ML-.1
250 PLASTIC BAG, INJECTION (ML) INJECTION
Refills: 0 | Status: DISCONTINUED | OUTPATIENT
Start: 2023-07-27 | End: 2023-07-27

## 2023-07-27 RX ORDER — DIPHENHYDRAMINE HCL 50 MG
25 CAPSULE ORAL EVERY 6 HOURS
Refills: 0 | Status: DISCONTINUED | OUTPATIENT
Start: 2023-07-27 | End: 2023-07-31

## 2023-07-27 RX ORDER — OXYCODONE HYDROCHLORIDE 5 MG/1
5 TABLET ORAL
Refills: 0 | Status: DISCONTINUED | OUTPATIENT
Start: 2023-07-27 | End: 2023-07-31

## 2023-07-27 RX ORDER — IBUPROFEN 200 MG
600 TABLET ORAL EVERY 6 HOURS
Refills: 0 | Status: COMPLETED | OUTPATIENT
Start: 2023-07-27 | End: 2024-06-24

## 2023-07-27 RX ORDER — MAGNESIUM HYDROXIDE 400 MG/1
30 TABLET, CHEWABLE ORAL
Refills: 0 | Status: DISCONTINUED | OUTPATIENT
Start: 2023-07-27 | End: 2023-07-31

## 2023-07-27 RX ORDER — OXYTOCIN 10 UNIT/ML
333.33 VIAL (ML) INJECTION
Qty: 20 | Refills: 0 | Status: DISCONTINUED | OUTPATIENT
Start: 2023-07-27 | End: 2023-07-31

## 2023-07-27 RX ORDER — ACETAMINOPHEN 500 MG
1000 TABLET ORAL ONCE
Refills: 0 | Status: COMPLETED | OUTPATIENT
Start: 2023-07-27 | End: 2023-07-27

## 2023-07-27 RX ORDER — NALOXONE HYDROCHLORIDE 4 MG/.1ML
0.1 SPRAY NASAL
Refills: 0 | Status: DISCONTINUED | OUTPATIENT
Start: 2023-07-27 | End: 2023-07-27

## 2023-07-27 RX ORDER — OXYCODONE HYDROCHLORIDE 5 MG/1
5 TABLET ORAL ONCE
Refills: 0 | Status: DISCONTINUED | OUTPATIENT
Start: 2023-07-27 | End: 2023-07-31

## 2023-07-27 RX ORDER — SODIUM CHLORIDE 9 MG/ML
1000 INJECTION, SOLUTION INTRAVENOUS
Refills: 0 | Status: DISCONTINUED | OUTPATIENT
Start: 2023-07-27 | End: 2023-07-31

## 2023-07-27 RX ORDER — CEFAZOLIN SODIUM 1 G
2000 VIAL (EA) INJECTION ONCE
Refills: 0 | Status: COMPLETED | OUTPATIENT
Start: 2023-07-27 | End: 2023-07-27

## 2023-07-27 RX ORDER — CITRIC ACID/SODIUM CITRATE 300-500 MG
30 SOLUTION, ORAL ORAL ONCE
Refills: 0 | Status: COMPLETED | OUTPATIENT
Start: 2023-07-27 | End: 2023-07-27

## 2023-07-27 RX ORDER — ENOXAPARIN SODIUM 100 MG/ML
40 INJECTION SUBCUTANEOUS EVERY 24 HOURS
Refills: 0 | Status: DISCONTINUED | OUTPATIENT
Start: 2023-07-27 | End: 2023-07-31

## 2023-07-27 RX ORDER — NIFEDIPINE 30 MG
30 TABLET, EXTENDED RELEASE 24 HR ORAL EVERY 24 HOURS
Refills: 0 | Status: DISCONTINUED | OUTPATIENT
Start: 2023-07-28 | End: 2023-07-31

## 2023-07-27 RX ORDER — CHLORHEXIDINE GLUCONATE 213 G/1000ML
1 SOLUTION TOPICAL ONCE
Refills: 0 | Status: DISCONTINUED | OUTPATIENT
Start: 2023-07-27 | End: 2023-07-27

## 2023-07-27 RX ORDER — ACETAMINOPHEN 500 MG
975 TABLET ORAL
Refills: 0 | Status: DISCONTINUED | OUTPATIENT
Start: 2023-07-27 | End: 2023-07-31

## 2023-07-27 RX ORDER — TETANUS TOXOID, REDUCED DIPHTHERIA TOXOID AND ACELLULAR PERTUSSIS VACCINE, ADSORBED 5; 2.5; 8; 8; 2.5 [IU]/.5ML; [IU]/.5ML; UG/.5ML; UG/.5ML; UG/.5ML
0.5 SUSPENSION INTRAMUSCULAR ONCE
Refills: 0 | Status: COMPLETED | OUTPATIENT
Start: 2023-07-27

## 2023-07-27 RX ORDER — LANOLIN
1 OINTMENT (GRAM) TOPICAL EVERY 6 HOURS
Refills: 0 | Status: DISCONTINUED | OUTPATIENT
Start: 2023-07-27 | End: 2023-07-31

## 2023-07-27 RX ORDER — ONDANSETRON 8 MG/1
4 TABLET, FILM COATED ORAL EVERY 6 HOURS
Refills: 0 | Status: DISCONTINUED | OUTPATIENT
Start: 2023-07-27 | End: 2023-07-27

## 2023-07-27 RX ORDER — KETOROLAC TROMETHAMINE 30 MG/ML
30 SYRINGE (ML) INJECTION EVERY 6 HOURS
Refills: 0 | Status: DISCONTINUED | OUTPATIENT
Start: 2023-07-27 | End: 2023-07-28

## 2023-07-27 RX ORDER — DEXAMETHASONE 0.5 MG/5ML
4 ELIXIR ORAL EVERY 6 HOURS
Refills: 0 | Status: DISCONTINUED | OUTPATIENT
Start: 2023-07-27 | End: 2023-07-27

## 2023-07-27 RX ORDER — LABETALOL HCL 100 MG
300 TABLET ORAL EVERY 8 HOURS
Refills: 0 | Status: DISCONTINUED | OUTPATIENT
Start: 2023-07-27 | End: 2023-07-31

## 2023-07-27 RX ADMIN — Medication 30 MILLIGRAM(S): at 06:50

## 2023-07-27 RX ADMIN — SODIUM CHLORIDE 125 MILLILITER(S): 9 INJECTION, SOLUTION INTRAVENOUS at 07:10

## 2023-07-27 RX ADMIN — Medication 975 MILLIGRAM(S): at 13:33

## 2023-07-27 RX ADMIN — Medication 30 MILLIGRAM(S): at 09:50

## 2023-07-27 RX ADMIN — Medication 30 MILLILITER(S): at 07:54

## 2023-07-27 RX ADMIN — Medication 300 MILLIGRAM(S): at 05:53

## 2023-07-27 RX ADMIN — Medication 975 MILLIGRAM(S): at 18:07

## 2023-07-27 RX ADMIN — Medication 300 MILLIGRAM(S): at 13:32

## 2023-07-27 RX ADMIN — Medication 100 MILLIGRAM(S): at 07:25

## 2023-07-27 RX ADMIN — Medication 300 MILLIGRAM(S): at 22:50

## 2023-07-27 RX ADMIN — Medication 30 MILLIGRAM(S): at 14:27

## 2023-07-27 RX ADMIN — ENOXAPARIN SODIUM 40 MILLIGRAM(S): 100 INJECTION SUBCUTANEOUS at 18:33

## 2023-07-28 ENCOUNTER — APPOINTMENT (OUTPATIENT)
Dept: ANTEPARTUM | Facility: CLINIC | Age: 28
End: 2023-07-28

## 2023-07-28 LAB
ANISOCYTOSIS BLD QL: SLIGHT — SIGNIFICANT CHANGE UP
BASOPHILS # BLD AUTO: 0 K/UL — SIGNIFICANT CHANGE UP (ref 0–0.2)
BASOPHILS NFR BLD AUTO: 0 % — SIGNIFICANT CHANGE UP (ref 0–2)
BURR CELLS BLD QL SMEAR: PRESENT — SIGNIFICANT CHANGE UP
EOSINOPHIL # BLD AUTO: 0 K/UL — SIGNIFICANT CHANGE UP (ref 0–0.5)
EOSINOPHIL NFR BLD AUTO: 0 % — SIGNIFICANT CHANGE UP (ref 0–6)
HCT VFR BLD CALC: 31.4 % — LOW (ref 34.5–45)
HGB BLD-MCNC: 10.3 G/DL — LOW (ref 11.5–15.5)
LYMPHOCYTES # BLD AUTO: 1.85 K/UL — SIGNIFICANT CHANGE UP (ref 1–3.3)
LYMPHOCYTES # BLD AUTO: 9.6 % — LOW (ref 13–44)
MACROCYTES BLD QL: SLIGHT — SIGNIFICANT CHANGE UP
MANUAL SMEAR VERIFICATION: SIGNIFICANT CHANGE UP
MCHC RBC-ENTMCNC: 30.6 PG — SIGNIFICANT CHANGE UP (ref 27–34)
MCHC RBC-ENTMCNC: 32.8 GM/DL — SIGNIFICANT CHANGE UP (ref 32–36)
MCV RBC AUTO: 93.2 FL — SIGNIFICANT CHANGE UP (ref 80–100)
MICROCYTES BLD QL: SLIGHT — SIGNIFICANT CHANGE UP
MONOCYTES # BLD AUTO: 0.83 K/UL — SIGNIFICANT CHANGE UP (ref 0–0.9)
MONOCYTES NFR BLD AUTO: 4.3 % — SIGNIFICANT CHANGE UP (ref 2–14)
NEUTROPHILS # BLD AUTO: 16.63 K/UL — HIGH (ref 1.8–7.4)
NEUTROPHILS NFR BLD AUTO: 86.1 % — HIGH (ref 43–77)
NRBC # BLD: 1 /100 — HIGH (ref 0–0)
NRBC # BLD: SIGNIFICANT CHANGE UP /100 WBCS (ref 0–0)
OVALOCYTES BLD QL SMEAR: SLIGHT — SIGNIFICANT CHANGE UP
PLAT MORPH BLD: ABNORMAL
PLATELET # BLD AUTO: 271 K/UL — SIGNIFICANT CHANGE UP (ref 150–400)
POIKILOCYTOSIS BLD QL AUTO: SLIGHT — SIGNIFICANT CHANGE UP
POLYCHROMASIA BLD QL SMEAR: SLIGHT — SIGNIFICANT CHANGE UP
RBC # BLD: 3.37 M/UL — LOW (ref 3.8–5.2)
RBC # FLD: 13.2 % — SIGNIFICANT CHANGE UP (ref 10.3–14.5)
RBC BLD AUTO: ABNORMAL
WBC # BLD: 19.32 K/UL — HIGH (ref 3.8–10.5)
WBC # FLD AUTO: 19.32 K/UL — HIGH (ref 3.8–10.5)

## 2023-07-28 RX ORDER — IBUPROFEN 200 MG
600 TABLET ORAL EVERY 6 HOURS
Refills: 0 | Status: DISCONTINUED | OUTPATIENT
Start: 2023-07-28 | End: 2023-07-31

## 2023-07-28 RX ADMIN — Medication 975 MILLIGRAM(S): at 03:39

## 2023-07-28 RX ADMIN — Medication 30 MILLIGRAM(S): at 00:43

## 2023-07-28 RX ADMIN — Medication 300 MILLIGRAM(S): at 06:38

## 2023-07-28 RX ADMIN — Medication 600 MILLIGRAM(S): at 11:48

## 2023-07-28 RX ADMIN — ENOXAPARIN SODIUM 40 MILLIGRAM(S): 100 INJECTION SUBCUTANEOUS at 17:12

## 2023-07-28 RX ADMIN — Medication 975 MILLIGRAM(S): at 22:00

## 2023-07-28 RX ADMIN — Medication 300 MILLIGRAM(S): at 23:39

## 2023-07-28 RX ADMIN — Medication 30 MILLIGRAM(S): at 06:38

## 2023-07-28 RX ADMIN — Medication 975 MILLIGRAM(S): at 15:06

## 2023-07-28 RX ADMIN — Medication 600 MILLIGRAM(S): at 17:12

## 2023-07-28 RX ADMIN — Medication 975 MILLIGRAM(S): at 08:13

## 2023-07-28 RX ADMIN — Medication 300 MILLIGRAM(S): at 14:07

## 2023-07-28 RX ADMIN — SIMETHICONE 80 MILLIGRAM(S): 80 TABLET, CHEWABLE ORAL at 08:13

## 2023-07-29 RX ADMIN — Medication 975 MILLIGRAM(S): at 22:01

## 2023-07-29 RX ADMIN — Medication 975 MILLIGRAM(S): at 16:44

## 2023-07-29 RX ADMIN — Medication 300 MILLIGRAM(S): at 21:56

## 2023-07-29 RX ADMIN — Medication 300 MILLIGRAM(S): at 06:39

## 2023-07-29 RX ADMIN — Medication 975 MILLIGRAM(S): at 09:14

## 2023-07-29 RX ADMIN — Medication 600 MILLIGRAM(S): at 18:40

## 2023-07-29 RX ADMIN — Medication 975 MILLIGRAM(S): at 15:44

## 2023-07-29 RX ADMIN — Medication 300 MILLIGRAM(S): at 13:59

## 2023-07-29 RX ADMIN — Medication 975 MILLIGRAM(S): at 10:00

## 2023-07-29 RX ADMIN — Medication 600 MILLIGRAM(S): at 13:00

## 2023-07-29 RX ADMIN — ENOXAPARIN SODIUM 40 MILLIGRAM(S): 100 INJECTION SUBCUTANEOUS at 21:56

## 2023-07-29 RX ADMIN — Medication 30 MILLIGRAM(S): at 07:29

## 2023-07-29 RX ADMIN — Medication 975 MILLIGRAM(S): at 00:43

## 2023-07-29 RX ADMIN — Medication 600 MILLIGRAM(S): at 19:39

## 2023-07-29 RX ADMIN — Medication 975 MILLIGRAM(S): at 05:32

## 2023-07-29 RX ADMIN — Medication 600 MILLIGRAM(S): at 06:40

## 2023-07-29 RX ADMIN — Medication 975 MILLIGRAM(S): at 03:01

## 2023-07-29 RX ADMIN — Medication 600 MILLIGRAM(S): at 12:14

## 2023-07-29 RX ADMIN — Medication 975 MILLIGRAM(S): at 23:00

## 2023-07-30 ENCOUNTER — TRANSCRIPTION ENCOUNTER (OUTPATIENT)
Age: 28
End: 2023-07-30

## 2023-07-30 RX ORDER — ACETAMINOPHEN 500 MG
3 TABLET ORAL
Qty: 0 | Refills: 0 | DISCHARGE
Start: 2023-07-30

## 2023-07-30 RX ORDER — IBUPROFEN 200 MG
1 TABLET ORAL
Qty: 0 | Refills: 0 | DISCHARGE
Start: 2023-07-30

## 2023-07-30 RX ADMIN — Medication 600 MILLIGRAM(S): at 19:07

## 2023-07-30 RX ADMIN — Medication 300 MILLIGRAM(S): at 20:34

## 2023-07-30 RX ADMIN — Medication 975 MILLIGRAM(S): at 17:08

## 2023-07-30 RX ADMIN — ENOXAPARIN SODIUM 40 MILLIGRAM(S): 100 INJECTION SUBCUTANEOUS at 20:34

## 2023-07-30 RX ADMIN — Medication 975 MILLIGRAM(S): at 09:53

## 2023-07-30 RX ADMIN — Medication 975 MILLIGRAM(S): at 16:08

## 2023-07-30 RX ADMIN — Medication 600 MILLIGRAM(S): at 05:37

## 2023-07-30 RX ADMIN — Medication 300 MILLIGRAM(S): at 13:48

## 2023-07-30 RX ADMIN — Medication 600 MILLIGRAM(S): at 18:07

## 2023-07-30 RX ADMIN — Medication 975 MILLIGRAM(S): at 08:53

## 2023-07-30 RX ADMIN — Medication 300 MILLIGRAM(S): at 05:37

## 2023-07-30 RX ADMIN — Medication 600 MILLIGRAM(S): at 13:17

## 2023-07-30 RX ADMIN — Medication 600 MILLIGRAM(S): at 06:30

## 2023-07-30 RX ADMIN — Medication 600 MILLIGRAM(S): at 12:17

## 2023-07-30 RX ADMIN — Medication 30 MILLIGRAM(S): at 08:55

## 2023-07-30 RX ADMIN — Medication 600 MILLIGRAM(S): at 02:00

## 2023-07-30 RX ADMIN — Medication 600 MILLIGRAM(S): at 01:09

## 2023-07-30 NOTE — DISCHARGE NOTE OB - CARE PLAN
1 Principal Discharge DX:	Postpartum state   Principal Discharge DX:	Postpartum state  Assessment and plan of treatment:	Follow up in the office with your doctor in one week. Please call to confirm your appointment. You can eat a regular diet. Please take 600mg Mortin every 6 hours and/or Tylenol 975mg every six hours as needed for pain. No heavy lifting and nothing in the vagina until cleared by your doctor. Please call your doctor with any questions or concerns.  Secondary Diagnosis:	Preeclampsia  Assessment and plan of treatment:	Monitor blood pressure twice daily.  Document blood pressures to review with obstetrician at follow-up appointment.  Call doctor for persistent systolic blood pressure (top number) equal or greater to 150 AND/OR diastolic blood pressure (bottom number) equal or above 100.      Return to hospital for systolic blood pressure (top number) equal to or greater than 160 AND/OR diastolic blood pressure (bottom number) equal to or greater than 110 OR any of the following symptoms: changes in vision, headache not relieved with Tylenol, severe abdominal pain, vomiting, increased vaginal bleeding, chest pain, or shortness of breath.

## 2023-07-30 NOTE — DISCHARGE NOTE OB - CARE PROVIDER_API CALL
Tomasa Barnett  Obstetrics and Gynecology  [facility], [address]  [place] [address]  Phone: [phone]  Fax: [phone]  Established Patient  Follow Up Time:    Tomasa Barnett  Obstetrics and Gynecology  [facility] [facility], [address]  [place] [address]  Phone: [phone]  Fax: [phone]  Established Patient  Follow Up Time:    Tomasa Barnett  Obstetrics and Gynecology  [facility], [address]  [place] [address]  Phone: [phone]  Fax: [phone]  Established Patient  Follow Up Time:

## 2023-07-30 NOTE — DISCHARGE NOTE OB - PLAN OF CARE
Monitor blood pressure twice daily.  Document blood pressures to review with obstetrician at follow-up appointment.  Call doctor for persistent systolic blood pressure (top number) equal or greater to 150 AND/OR diastolic blood pressure (bottom number) equal or above 100.      Return to hospital for systolic blood pressure (top number) equal to or greater than 160 AND/OR diastolic blood pressure (bottom number) equal to or greater than 110 OR any of the following symptoms: changes in vision, headache not relieved with Tylenol, severe abdominal pain, vomiting, increased vaginal bleeding, chest pain, or shortness of breath. Follow up in the office with your doctor in one week. Please call to confirm your appointment. You can eat a regular diet. Please take 600mg Mortin every 6 hours and/or Tylenol 975mg every six hours as needed for pain. No heavy lifting and nothing in the vagina until cleared by your doctor. Please call your doctor with any questions or concerns.

## 2023-07-30 NOTE — DISCHARGE NOTE OB - HOSPITAL COURSE
28y female s/p  section, post operative day 4 s/p pCS at 33w6d for NRFHT after IOL for IUGR c/b SiPEC w/out SF. Vitals stable for discharge on labetalol 300mgTID and Nifedipine 30 qd.

## 2023-07-30 NOTE — DISCHARGE NOTE OB - MEDICATION SUMMARY - MEDICATIONS TO TAKE
I will START or STAY ON the medications listed below when I get home from the hospital:    ibuprofen 600 mg oral tablet  -- 1 tab(s) by mouth every 6 hours  -- Indication: For Pain    acetaminophen 325 mg oral tablet  -- 3 tab(s) by mouth every 6 hours  -- Indication: For Pain    labetalol 300 mg oral tablet  -- 1 tab(s) by mouth every 8 hours Do not take for BP<110/65 or for HR<65  -- Indication: For blood pressure    NIFEdipine 30 mg oral tablet, extended release  -- 1 tab(s) by mouth once a day Do not take for BP<110/65 or for HR>110  -- Indication: For blood pressure    Prenatal Multivitamins with Folic Acid 1 mg oral tablet  -- 1 tab(s) by mouth once a day  -- Indication: For home

## 2023-07-30 NOTE — DISCHARGE NOTE OB - PATIENT PORTAL LINK FT
You can access the FollowMyHealth Patient Portal offered by [facility] by registering at the following website: [email or web address]. By joining [employer]’s FollowMyHealth portal, you will also be able to view your health information using other applications (apps) compatible with our system. You can access the FollowMyHealth Patient Portal offered by [facility] by registering at the following website: [email or web address]. By joining [employer]’s FollowMyHealth portal, you will also be able to view your health information using other applications (apps) compatible with our system. You can access the FollowMyHealth Patient Portal offered by [facility] by registering at the following website: [email or web address]. By joining [employer]’s FollowMyHealth portal, you will also be able to view your health information using other applications (apps) compatible with our system.

## 2023-07-30 NOTE — DISCHARGE NOTE OB - PAIN PRESENT
----- Message from Eric Irving MD sent at 4/23/2017  2:38 PM CDT -----  pls call pt; had sent him mychart result note but still not read it yet No

## 2023-07-30 NOTE — DISCHARGE NOTE OB - MEDICATION SUMMARY - MEDICATIONS TO STOP TAKING
I will STOP taking the medications listed below when I get home from the hospital:    aspirin 81 mg oral tablet, chewable  -- 2 tab(s) by mouth every 24 hours

## 2023-07-30 NOTE — DISCHARGE NOTE OB - NS MD DC FALL RISK RISK
For information on Fall & Injury Prevention, visit: [email or web address] OR  [email or web address] OR  https://www.cdc.gov/steadi/patient.html For information on Fall & Injury Prevention, visit: [email or web address]://[email or web address] [place] [email or web address]/[email or web address]-[email or web address]-mobility OR  [email or web address]://[email or web address] [email or web address] [email or web address]/[email or web address]-[email or web address]-[email or web address]-[email or web address]-avoid-injury OR  https://www.cdc.gov/steadi/patient.html For information on Fall & Injury Prevention, visit: [email or web address]://[email or web address]/[email or web address]-[email or web address]-[email or web address]-mobility OR  [email or web address]://[email or web address]-[email or web address]-injury OR  https://www.cdc.gov/steadi/patient.html

## 2023-07-30 NOTE — DISCHARGE NOTE OB - BREAST MILK PROVIDES COLOSTRUM THAT IS HIGH IN PROTEIN
Physiatrist Progress Note


DATE OF SERVICE:  8/15/17





DATE OF ADMISSION: Aug 11, 2017 at 14:55 


INPATIENT REHABILITATION ADMISSION DAY: #5 





SUBJECTIVE: Patient is a The patient is an 79-year-old right handed white male 

who has fallen three times in the last 2 weeks and even fell off the toilet


while inpatient.  The patient had hit his head.  CT scan showed the old 

cerebellar and new small right frontal CVA, along with no hemorrhaging or signs


of traumatic brain injury. However, patient with Parkinsonian features and 

likely having Autonomic Dysfunction due to this. He denies any recent 

lightheadedness or dizziness. No pain complaints.





ALLERGIES: See Below





MEDICATIONS: Reviewed, see below.





OBJECTIVE:


VITAL SIGNS: Please see below.


PHYSICAL EXAMINATION:


GENERAL: All elderly white male who is alert and general well oriented in no 

acute distress. Patient seen in his room standing using a standard walker 

gaiting his blood pressure checked by occupational therapy.


HEENT: Normocephalic/atraumatic with no tremors or pill-rolling.


CARDIOVASCULAR: Regular rate and rhythm with a holosystolic murmur. 2 out 4 

bilateral radial pulses.


LUNGS: All fields clear to auscultation.


ABDOMEN: Benign with normal bowel sounds in all quadrants.


NEUROLOGICAL: Patient alert and oriented to person place and most elements of 

time with good understanding of situation. Clock drawing test however shows 

organizational problems and skewing to the right. Patient continues to have 

cogwheeling tone in the upper extremities but improved from Friday. However 

balance was stable with the walker. There is a mild resting tremor and some 

element of intention tremor present on upper extremity actions.


SKIN: Grossly intact.





LABORATORY DATA: Reviewed. Please see below.





MICROBIOLOGY: Please see below.





IMAGING: No new imaging.





DVT prophylaxis ordered?: Lovenox and SANTO hose. We'll look to get 20 to 30 mm 

compression thigh high support hose which will will be more helpful and she'll 

also keep blood pressure from dropping as much when patient sits up and then 

stands up.





ASSESSMENT AND PLAN:


1. Rehabilitation of CVA and Parkinson's disease: Patient clearly has features 

of Parkinson's and very little of his deficits in mobility and safety seem to 

be consistent with right frontal lobe CVA. Patient is participating well in 

physical and occupational therapy and showing gains in endurance and balance.


Rehabilitation team rounds: In light of the clock drawing test and some other 

deficits patient has shown I will go ahead and have speech-language pathology 

did a more formal cognitive assessment of this gentleman. Overall patient is 

doing very well and should reach discharge to home goals with family around 

August 18th. It is anticipated he will need a commode. In light of the stroke 

and the very likely that he has Parkinson's disease, I do not feel patient is 

appropriate to return to driving until cleared by the state of [place].





2. Hypotension: Patient seems to be not dropping as low and tolerating it better

, but definitely if given a little bit time at each level and doing some muscle 

contractions in the leg doesn't seem to drop blood pressure as much. I 

definitely think that support hose will make a difference for this gentleman 

will try to get him some appropriate thigh high 20-30 mg compression hose, but 

for now Ace wrapping.





3. Anemia: Overall seems reasonably stable with a hemoglobin of 10.5 and 

hematocrit of 32.8% on 8/14/17. Of concern is the macrocytosis which we will 

continue to watch.





4. Hypo-albuminemia:  On 8/14/17 was 2.3 and we will watch this.





5. Magnesium: Currently normal at 2.3, we will watch this.





TIME SPENT: Chart Review, examination and documentation required greater than 

25 minutes.


Allergies


Coded Allergies:  


     No Known Drug Allergy (Verified  Allergy, Unknown, 8/7/17)





Vital Signs





Vital Signs








  Date Time  Temp Pulse Resp B/P (MAP) Pulse Ox O2 Delivery O2 Flow Rate FiO2


 


8/15/17 09:00      Room Air  


 


8/15/17 06:00 98.9 69 18 141/74 (96) 97   











Laboratory Data


Labs 24H


Laboratory Tests 2


8/15/17 07:06: Magnesium Level 2.3





Current Medications


Current Medications





Current Medications


Acetaminophen (Tylenol Tab) 650 mg Q6HP  PRN PO PAIN OR FEVER;  Start 8/11/17 

at 15:45;  Stop 9/10/17 at 15:44


Allopurinol (Zyloprim) 300 mg DAILY PO  Last administered on 8/15/17at 08:23;  

Start 8/12/17 at 09:00;  Stop 9/11/17 at 08:59


Aspirin (Ecotrin) 81 mg DAILY PO  Last administered on 8/15/17at 08:23;  Start 8 /12/17 at 09:00;  Stop 9/11/17 at 08:59


Carbidopa/Levodopa (Sinemet 25/100) 1 tab TID@0800,1200,1600 PO  Last 

administered on 8/15/17at 08:23;  Start 8/11/17 at 16:00;  Stop 9/10/17 at 15:59


Clopidogrel Bisulfate (PLAVix) 75 mg DAILY PO  Last administered on 8/15/17at 08

:23;  Start 8/12/17 at 09:00;  Stop 9/11/17 at 08:59


Docusate Sodium (Colace) 100 mg BID PO  Last administered on 8/15/17at 08:23;  

Start 8/11/17 at 21:00;  Stop 9/10/17 at 20:59


Enoxaparin Sodium (Lovenox) 30 mg DAILY SC  Last administered on 8/15/17at 08:22

;  Start 8/12/17 at 09:00;  Stop 8/22/17 at 23:55


Ferrous Sulfate (Ferrous Sulfate) 325 mg BID PO  Last administered on 8/15/17at 

08:23;  Start 8/11/17 at 21:00;  Stop 9/10/17 at 20:59


Magnesium Hydroxide (Milk Of Magnesia) 30 ml BIDP  PRN PO CONSTIPATION;  Start 8 /11/17 at 15:45;  Stop 9/10/17 at 15:44


Pantoprazole Sodium (Protonix) 40 mg DAILY PO  Last administered on 8/15/17at 08

:23;  Start 8/12/17 at 09:00;  Stop 9/11/17 at 08:59


Polyethylene Glycol (Miralax) 1 pkt DAILY  PRN PO CONSTIPATION;  Start 8/11/17 

at 15:45;  Stop 9/10/17 at 15:44


Senna/Docusate Sodium (Senokot S) 1 tab BID PO  Last administered on 8/15/17at 

08:22;  Start 8/11/17 at 21:00;  Stop 9/10/17 at 20:59


Simvastatin (Zocor) 40 mg DAILY PO  Last administered on 8/15/17at 08:23;  

Start 8/12/17 at 09:00;  Stop 9/11/17 at 08:59


Tamsulosin HCl (Flomax) 0.4 mg DAILY PO  Last administered on 8/15/17at 08:23;  

Start 8/12/17 at 09:00;  Stop 9/11/17 at 08:59


Tramadol HCl (Ultram) 50 mg Q6HP  PRN PO PAIN;  Start 8/11/17 at 15:45;  Stop 8/ 18/17 at 15:44


Vitamin D (Vitamin D) 1,000 units DAILY PO  Last administered on 8/15/17at 08:23

;  Start 8/12/17 at 09:00;  Stop 9/11/17 at 08:59











ALEX MCWILLIAMS MD Aug 15, 2017 11:13 Statement Selected

## 2023-07-31 ENCOUNTER — APPOINTMENT (OUTPATIENT)
Dept: ANTEPARTUM | Facility: CLINIC | Age: 28
End: 2023-07-31

## 2023-07-31 VITALS
OXYGEN SATURATION: 97 % | HEART RATE: 82 BPM | TEMPERATURE: 99 F | DIASTOLIC BLOOD PRESSURE: 81 MMHG | RESPIRATION RATE: 18 BRPM | SYSTOLIC BLOOD PRESSURE: 121 MMHG

## 2023-07-31 PROCEDURE — 82570 ASSAY OF URINE CREATININE: CPT

## 2023-07-31 PROCEDURE — 85610 PROTHROMBIN TIME: CPT

## 2023-07-31 PROCEDURE — 84550 ASSAY OF BLOOD/URIC ACID: CPT

## 2023-07-31 PROCEDURE — 59050 FETAL MONITOR W/REPORT: CPT

## 2023-07-31 PROCEDURE — 83615 LACTATE (LD) (LDH) ENZYME: CPT

## 2023-07-31 PROCEDURE — 86850 RBC ANTIBODY SCREEN: CPT

## 2023-07-31 PROCEDURE — 93306 TTE W/DOPPLER COMPLETE: CPT

## 2023-07-31 PROCEDURE — 86769 SARS-COV-2 COVID-19 ANTIBODY: CPT

## 2023-07-31 PROCEDURE — 85025 COMPLETE CBC W/AUTO DIFF WBC: CPT

## 2023-07-31 PROCEDURE — 84156 ASSAY OF PROTEIN URINE: CPT

## 2023-07-31 PROCEDURE — 80053 COMPREHEN METABOLIC PANEL: CPT

## 2023-07-31 PROCEDURE — 99214 OFFICE O/P EST MOD 30 MIN: CPT

## 2023-07-31 PROCEDURE — 86780 TREPONEMA PALLIDUM: CPT

## 2023-07-31 PROCEDURE — 85730 THROMBOPLASTIN TIME PARTIAL: CPT

## 2023-07-31 PROCEDURE — 88307 TISSUE EXAM BY PATHOLOGIST: CPT

## 2023-07-31 PROCEDURE — 86900 BLOOD TYPING SEROLOGIC ABO: CPT

## 2023-07-31 PROCEDURE — 90715 TDAP VACCINE 7 YRS/> IM: CPT

## 2023-07-31 PROCEDURE — 85384 FIBRINOGEN ACTIVITY: CPT

## 2023-07-31 PROCEDURE — 36415 COLL VENOUS BLD VENIPUNCTURE: CPT

## 2023-07-31 PROCEDURE — 86901 BLOOD TYPING SEROLOGIC RH(D): CPT

## 2023-07-31 RX ORDER — TETANUS TOXOID, REDUCED DIPHTHERIA TOXOID AND ACELLULAR PERTUSSIS VACCINE, ADSORBED 5; 2.5; 8; 8; 2.5 [IU]/.5ML; [IU]/.5ML; UG/.5ML; UG/.5ML; UG/.5ML
0.5 SUSPENSION INTRAMUSCULAR ONCE
Refills: 0 | Status: COMPLETED | OUTPATIENT
Start: 2023-07-31 | End: 2023-07-31

## 2023-07-31 RX ADMIN — Medication 975 MILLIGRAM(S): at 15:16

## 2023-07-31 RX ADMIN — Medication 975 MILLIGRAM(S): at 09:20

## 2023-07-31 RX ADMIN — Medication 975 MILLIGRAM(S): at 09:50

## 2023-07-31 RX ADMIN — Medication 600 MILLIGRAM(S): at 05:55

## 2023-07-31 RX ADMIN — Medication 300 MILLIGRAM(S): at 05:55

## 2023-07-31 RX ADMIN — Medication 975 MILLIGRAM(S): at 15:46

## 2023-07-31 RX ADMIN — Medication 600 MILLIGRAM(S): at 12:52

## 2023-07-31 RX ADMIN — TETANUS TOXOID, REDUCED DIPHTHERIA TOXOID AND ACELLULAR PERTUSSIS VACCINE, ADSORBED 0.5 MILLILITER(S): 5; 2.5; 8; 8; 2.5 SUSPENSION INTRAMUSCULAR at 15:17

## 2023-07-31 RX ADMIN — Medication 600 MILLIGRAM(S): at 18:36

## 2023-07-31 RX ADMIN — Medication 600 MILLIGRAM(S): at 12:22

## 2023-07-31 RX ADMIN — Medication 300 MILLIGRAM(S): at 14:19

## 2023-07-31 RX ADMIN — Medication 30 MILLIGRAM(S): at 09:20

## 2023-07-31 NOTE — PROGRESS NOTE ADULT - SUBJECTIVE AND OBJECTIVE BOX
Patient evaluated at bedside.   She reports pain is well controlled with OPM.  She has been ambulating without assistance, voiding spontaneously, passing gas, tolerating regular diet.  She denies HA, dizziness, CP, palpitations, SOB, RUQ/epigastric pain, n/v, or heavy vaginal bleeding.    Physical Exam:  Vital Signs Last 24 Hrs  T(C): 36.6 (30 Jul 2023 07:20), Max: 36.9 (29 Jul 2023 10:00)  T(F): 97.9 (30 Jul 2023 07:20), Max: 98.4 (29 Jul 2023 10:00)  HR: 78 (30 Jul 2023 07:20) (76 - 93)  BP: 135/86 (30 Jul 2023 07:20) (120/75 - 148/92)  BP(mean): --  RR: 18 (30 Jul 2023 07:20) (18 - 18)  SpO2: 97% (30 Jul 2023 07:20) (95% - 97%)    Parameters below as of 30 Jul 2023 05:33  Patient On (Oxygen Delivery Method): room air        Gen: NAD  Abd: + BS, soft, nontender, nondistended, no rebound or guarding  Incision clean, dry and intact  uterus firm at midline  No RUQ/epigastric tenderness  : lochia WNL  Extremities: no swelling or calf tenderness                
Patient evaluated at bedside this morning, resting comfortable in bed.   She reports pain is well controlled with Tylenol and Motrin.   Reports decrease in amount of vaginal bleeding  She has been ambulating without assistance, voiding spontaneously, passing gas, and tolerating regular diet without nausea/vomiting.  Denies scotoma, RUQ pain, SOB. Has 1/10 frontal headache that started last night.    Physical Exam:  Vital Signs Last 24 Hrs  T(C): 36.6 (31 Jul 2023 05:47), Max: 37.1 (30 Jul 2023 17:31)  T(F): 97.9 (31 Jul 2023 05:47), Max: 98.7 (30 Jul 2023 17:31)  HR: 81 (31 Jul 2023 05:47) (81 - 90)  BP: 133/82 (31 Jul 2023 06:08) (120/80 - 143/91)  BP(mean): --  RR: 18 (31 Jul 2023 05:47) (16 - 18)  SpO2: 99% (31 Jul 2023 05:47) (95% - 99%)    Parameters below as of 30 Jul 2023 20:30  Patient On (Oxygen Delivery Method): room air        GA: Alert, comfortable, NAD  Pulm: Breathing comfortably on RA  Abd: Uterine fundus firm, tenderness appropriate with post-op state, incision clean/dry/intact  Extremities: no swelling or calf tenderness                   
Patient evaluated at bedside.   She reports pain is well controlled with OPM.  She has been ambulating without assistance, passing gas, tolerating regular diet. She is not yet voiding spontaneously, f/u TOV  She denies HA, dizziness, CP, palpitations, SOB, RUQ/epigastric pain, n/v, or heavy vaginal bleeding.    Physical Exam:  Vital Signs Last 24 Hrs  T(C): 36.7 (28 Jul 2023 05:55), Max: 37.2 (27 Jul 2023 08:45)  T(F): 98.1 (28 Jul 2023 05:55), Max: 99 (27 Jul 2023 08:45)  HR: 73 (28 Jul 2023 05:55) (62 - 82)  BP: 129/81 (28 Jul 2023 05:55) (117/73 - 145/88)  BP(mean): 95 (27 Jul 2023 10:00) (85 - 100)  RR: 17 (28 Jul 2023 05:55) (16 - 17)  SpO2: 97% (28 Jul 2023 05:55) (95% - 100%)    Parameters below as of 27 Jul 2023 14:49  Patient On (Oxygen Delivery Method): room air        Gen: NAD  Abd: + BS, soft, nontender, nondistended, no rebound or guarding  Incision clean, dry and intact  uterus firm at midline  No RUQ/epigastric tenderness  : lochia WNL  Extremities: no swelling or calf tenderness                          10.3   19.32 )-----------( 271      ( 28 Jul 2023 05:30 )             31.4               
Patient evaluated at bedside this morning, resting comfortable in bed.   She reports pain is well controlled with oral pain medications.   She denies headache, vision changes, RUQ/epigastric pain, dizziness, chest pain, palpitations, shortness of breath, nausea, vomiting or heavy vaginal bleeding.  She has been ambulating without assistance, voiding spontaneously, passing gas, tolerating regular diet.     Physical Exam:  Vital Signs Last 24 Hrs  T(C): 36.8 (29 Jul 2023 02:00), Max: 37.2 (28 Jul 2023 22:13)  T(F): 98.2 (29 Jul 2023 02:00), Max: 98.9 (28 Jul 2023 22:13)  HR: 82 (29 Jul 2023 02:00) (71 - 89)  BP: 120/80 (29 Jul 2023 02:00) (120/80 - 139/87)  BP(mean): --  RR: 17 (29 Jul 2023 02:00) (17 - 17)  SpO2: 97% (29 Jul 2023 02:00) (97% - 99%)        GA: NAD, A+0 x 3  Pulm: no increased WOB  Abd: soft, nontender, nondistended, no rebound or guarding, incision clean, dry and intact, uterus firm at midline, 2 fb below umbilicus  Extremities: no swelling or calf tenderness                             10.3   19.32 )-----------( 271      ( 28 Jul 2023 05:30 )             31.4

## 2023-07-31 NOTE — PROGRESS NOTE ADULT - ASSESSMENT
28y Female POD#1 s/p  C/S at 33+6 with siPEC w/o SF  - Blood pressures normotensive to mild range overnight. Denies toxic complaints.                             - Neuro/Pain: toradol atc, tylenol atc, oxy prn  - CV:  VSq4h, AM CBC pending  - Pulm: Encourage ISS & Ambulation  - GI: Advance as tolerated  - : Berry in place, to be removed this morning, TOV this afternoon  - DVT ppx: SCDs, Lovenox 40mg QD  - Dispo: POD #2/3
A/P: 28y s/p  section c/b PEC w/o SF, POD#4, stable  - PEC w/o SF: BPs well controlled on labetalol 300 TID and nifedipine 30 QD. Mild headache - will follow up how she feels after pain medicines given this AM and breakfast.  -  Pain: PO motrin and Tylenol, oxycodone for severe pain PRN  -  GI: tolerating regular diet, passing gas  -  : s/p loomis , urinating without difficulty  -  DVT prophylaxis: encouraged increased ambulation, SCDs, SQL  -  Dispo: POD 3 or 4
A/P: 28y s/p  section, POD#3, stable, c/b siPEC w/o SF  #siPEC w/o SF  - mild range BP overnight  - denying toxic symptoms  - labetalol 300 TID, nifedipine 30 qd    #PP Care  -  Pain: PO motrin q6hrs, tylenol q8hrs, oxycodone for severe pain PRN  -  Post-operatively labs: hemodynamically stable, no symptoms of anemia   -  GI: tolerating regular diet, passing gas  -  : s/p loomis , urinating without difficulty  -  DVT prophylaxis: encouraged increased ambulation, SCDs, SQL  -  Dispo: POD 3 or 4
A/P: 28y s/p  section, POD#2, stable, c/b siPEC w/o SF  #siPEC w/o SF  - normotensive  - denying toxic symptoms  - labetalol 300 TID, nifedipine 30 qd    #PP Care  -  Pain: PO motrin q6hrs, tylenol q8hrs, oxycodone for severe pain PRN  -  Post-operatively labs: hemodynamically stable, no symptoms of anemia   -  GI: tolerating regular diet, passing gas  -  : s/p loomis , urinating without difficulty  -  DVT prophylaxis: encouraged increased ambulation, SCDs, SQL  -  Dispo: POD 3 or 4

## 2023-08-01 PROBLEM — Z00.00 ENCOUNTER FOR PREVENTIVE HEALTH EXAMINATION: Status: ACTIVE | Noted: 2023-08-01

## 2023-08-03 ENCOUNTER — APPOINTMENT (OUTPATIENT)
Dept: ANTEPARTUM | Facility: CLINIC | Age: 28
End: 2023-08-03

## 2023-08-03 DIAGNOSIS — Z88.2 ALLERGY STATUS TO SULFONAMIDES: ICD-10-CM

## 2023-08-03 DIAGNOSIS — O61.0 FAILED MEDICAL INDUCTION OF LABOR: ICD-10-CM

## 2023-08-03 DIAGNOSIS — Z23 ENCOUNTER FOR IMMUNIZATION: ICD-10-CM

## 2023-08-03 DIAGNOSIS — Z3A.33 33 WEEKS GESTATION OF PREGNANCY: ICD-10-CM

## 2023-08-03 DIAGNOSIS — O36.5930 MATERNAL CARE FOR OTHER KNOWN OR SUSPECTED POOR FETAL GROWTH, THIRD TRIMESTER, NOT APPLICABLE OR UNSPECIFIED: ICD-10-CM

## 2023-08-08 ENCOUNTER — APPOINTMENT (OUTPATIENT)
Dept: ANTEPARTUM | Facility: CLINIC | Age: 28
End: 2023-08-08

## 2023-08-11 LAB
SURGICAL PATHOLOGY STUDY: SIGNIFICANT CHANGE UP

## 2023-08-15 ENCOUNTER — APPOINTMENT (OUTPATIENT)
Dept: ANTEPARTUM | Facility: CLINIC | Age: 28
End: 2023-08-15

## 2023-08-22 ENCOUNTER — APPOINTMENT (OUTPATIENT)
Dept: ANTEPARTUM | Facility: CLINIC | Age: 28
End: 2023-08-22

## 2023-09-19 ENCOUNTER — APPOINTMENT (OUTPATIENT)
Dept: UROLOGY | Facility: CLINIC | Age: 28
End: 2023-09-19
Payer: COMMERCIAL

## 2023-09-19 VITALS
TEMPERATURE: 98 F | SYSTOLIC BLOOD PRESSURE: 122 MMHG | OXYGEN SATURATION: 99 % | HEIGHT: 64 IN | RESPIRATION RATE: 17 BRPM | WEIGHT: 167 LBS | DIASTOLIC BLOOD PRESSURE: 77 MMHG | HEART RATE: 65 BPM | BODY MASS INDEX: 28.51 KG/M2

## 2023-09-19 DIAGNOSIS — Z80.0 FAMILY HISTORY OF MALIGNANT NEOPLASM OF DIGESTIVE ORGANS: ICD-10-CM

## 2023-09-19 DIAGNOSIS — R39.89 OTHER SYMPTOMS AND SIGNS INVOLVING THE GENITOURINARY SYSTEM: ICD-10-CM

## 2023-09-19 DIAGNOSIS — Z82.49 FAMILY HISTORY OF ISCHEMIC HEART DISEASE AND OTHER DISEASES OF THE CIRCULATORY SYSTEM: ICD-10-CM

## 2023-09-19 DIAGNOSIS — Z78.9 OTHER SPECIFIED HEALTH STATUS: ICD-10-CM

## 2023-09-19 LAB
BILIRUB UR QL STRIP: NORMAL
CLARITY UR: CLEAR
COLLECTION METHOD: NORMAL
GLUCOSE UR-MCNC: NORMAL
HCG UR QL: 0.2 EU/DL
HGB UR QL STRIP.AUTO: NORMAL
KETONES UR-MCNC: NORMAL
LEUKOCYTE ESTERASE UR QL STRIP: NORMAL
NITRITE UR QL STRIP: NORMAL
PH UR STRIP: 5.5
PROT UR STRIP-MCNC: NORMAL
SP GR UR STRIP: 1.02

## 2023-09-19 PROCEDURE — 81003 URINALYSIS AUTO W/O SCOPE: CPT | Mod: QW

## 2023-09-19 PROCEDURE — 99203 OFFICE O/P NEW LOW 30 MIN: CPT | Mod: 25

## 2023-09-19 RX ORDER — LABETALOL HYDROCHLORIDE 300 MG/1
TABLET, FILM COATED ORAL
Refills: 0 | Status: ACTIVE | COMMUNITY

## 2023-09-19 RX ORDER — NIFEDIPINE 60 MG
60 TABLET, EXTENDED RELEASE ORAL
Refills: 0 | Status: ACTIVE | COMMUNITY

## 2023-09-20 LAB
APPEARANCE: CLEAR
BACTERIA: NEGATIVE /HPF
BILIRUBIN URINE: NEGATIVE
BLOOD URINE: NEGATIVE
CAST: 0 /LPF
COLOR: YELLOW
EPITHELIAL CELLS: 13 /HPF
GLUCOSE QUALITATIVE U: NEGATIVE MG/DL
KETONES URINE: NEGATIVE MG/DL
LEUKOCYTE ESTERASE URINE: NEGATIVE
MICROSCOPIC-UA: NORMAL
NITRITE URINE: NEGATIVE
PH URINE: 6
PROTEIN URINE: NORMAL MG/DL
RED BLOOD CELLS URINE: 0 /HPF
SPECIFIC GRAVITY URINE: 1.02
UROBILINOGEN URINE: 0.2 MG/DL
WHITE BLOOD CELLS URINE: 0 /HPF

## 2024-03-19 ENCOUNTER — APPOINTMENT (OUTPATIENT)
Dept: UROLOGY | Facility: CLINIC | Age: 29
End: 2024-03-19

## 2024-12-03 NOTE — ED CDU PROVIDER SUBSEQUENT DAY NOTE - LIVES WITH, PROFILE
Patient is in the lateral left side position.   The body was positioned using the following devices: wedge.  Positioned self spouse

## 2025-05-29 NOTE — ED ADULT TRIAGE NOTE - PRO INTERPRETER NEED 2
change of breathing pattern/oral hygiene/position upright (89Y)/cough/gurgly voice/fever/pneumonia/throat clearing/upper respiratory infection
English
4

## 2025-09-04 ENCOUNTER — EMERGENCY (EMERGENCY)
Facility: HOSPITAL | Age: 30
LOS: 1 days | End: 2025-09-04
Attending: EMERGENCY MEDICINE | Admitting: EMERGENCY MEDICINE
Payer: COMMERCIAL

## 2025-09-04 VITALS
RESPIRATION RATE: 16 BRPM | OXYGEN SATURATION: 100 % | WEIGHT: 171.96 LBS | DIASTOLIC BLOOD PRESSURE: 94 MMHG | HEIGHT: 64 IN | TEMPERATURE: 98 F | HEART RATE: 99 BPM | SYSTOLIC BLOOD PRESSURE: 152 MMHG

## 2025-09-04 VITALS
DIASTOLIC BLOOD PRESSURE: 71 MMHG | TEMPERATURE: 98 F | OXYGEN SATURATION: 98 % | SYSTOLIC BLOOD PRESSURE: 111 MMHG | HEART RATE: 82 BPM | RESPIRATION RATE: 16 BRPM

## 2025-09-04 DIAGNOSIS — Z98.891 HISTORY OF UTERINE SCAR FROM PREVIOUS SURGERY: Chronic | ICD-10-CM

## 2025-09-04 DIAGNOSIS — Z98.890 OTHER SPECIFIED POSTPROCEDURAL STATES: Chronic | ICD-10-CM

## 2025-09-04 LAB
ALBUMIN SERPL ELPH-MCNC: 3.8 G/DL — SIGNIFICANT CHANGE UP (ref 3.3–5)
ALP SERPL-CCNC: 97 U/L — SIGNIFICANT CHANGE UP (ref 30–120)
ALT FLD-CCNC: 18 U/L — SIGNIFICANT CHANGE UP (ref 10–60)
ANION GAP SERPL CALC-SCNC: 9 MMOL/L — SIGNIFICANT CHANGE UP (ref 5–17)
APTT BLD: 32.8 SEC — SIGNIFICANT CHANGE UP (ref 26.1–36.8)
AST SERPL-CCNC: 15 U/L — SIGNIFICANT CHANGE UP (ref 10–40)
BASOPHILS # BLD AUTO: 0.05 K/UL — SIGNIFICANT CHANGE UP (ref 0–0.2)
BASOPHILS NFR BLD AUTO: 0.7 % — SIGNIFICANT CHANGE UP (ref 0–2)
BILIRUB SERPL-MCNC: 0.5 MG/DL — SIGNIFICANT CHANGE UP (ref 0.2–1.2)
BUN SERPL-MCNC: 11 MG/DL — SIGNIFICANT CHANGE UP (ref 7–23)
CALCIUM SERPL-MCNC: 9.3 MG/DL — SIGNIFICANT CHANGE UP (ref 8.4–10.5)
CHLORIDE SERPL-SCNC: 104 MMOL/L — SIGNIFICANT CHANGE UP (ref 96–108)
CO2 SERPL-SCNC: 27 MMOL/L — SIGNIFICANT CHANGE UP (ref 22–31)
CREAT SERPL-MCNC: 0.82 MG/DL — SIGNIFICANT CHANGE UP (ref 0.5–1.3)
EGFR: 98 ML/MIN/1.73M2 — SIGNIFICANT CHANGE UP
EGFR: 98 ML/MIN/1.73M2 — SIGNIFICANT CHANGE UP
EOSINOPHIL # BLD AUTO: 0.03 K/UL — SIGNIFICANT CHANGE UP (ref 0–0.5)
EOSINOPHIL NFR BLD AUTO: 0.4 % — SIGNIFICANT CHANGE UP (ref 0–6)
GLUCOSE SERPL-MCNC: 101 MG/DL — HIGH (ref 70–99)
HCG UR QL: NEGATIVE — SIGNIFICANT CHANGE UP
HCT VFR BLD CALC: 39.7 % — SIGNIFICANT CHANGE UP (ref 34.5–45)
HGB BLD-MCNC: 13 G/DL — SIGNIFICANT CHANGE UP (ref 11.5–15.5)
IMM GRANULOCYTES # BLD AUTO: 0.01 K/UL — SIGNIFICANT CHANGE UP (ref 0–0.07)
IMM GRANULOCYTES NFR BLD AUTO: 0.1 % — SIGNIFICANT CHANGE UP (ref 0–0.9)
INR BLD: 1.08 RATIO — SIGNIFICANT CHANGE UP (ref 0.85–1.16)
LYMPHOCYTES # BLD AUTO: 1.59 K/UL — SIGNIFICANT CHANGE UP (ref 1–3.3)
LYMPHOCYTES NFR BLD AUTO: 22.1 % — SIGNIFICANT CHANGE UP (ref 13–44)
MCHC RBC-ENTMCNC: 29 PG — SIGNIFICANT CHANGE UP (ref 27–34)
MCHC RBC-ENTMCNC: 32.7 G/DL — SIGNIFICANT CHANGE UP (ref 32–36)
MCV RBC AUTO: 88.6 FL — SIGNIFICANT CHANGE UP (ref 80–100)
MONOCYTES # BLD AUTO: 0.47 K/UL — SIGNIFICANT CHANGE UP (ref 0–0.9)
MONOCYTES NFR BLD AUTO: 6.5 % — SIGNIFICANT CHANGE UP (ref 2–14)
NEUTROPHILS # BLD AUTO: 5.04 K/UL — SIGNIFICANT CHANGE UP (ref 1.8–7.4)
NEUTROPHILS NFR BLD AUTO: 70.2 % — SIGNIFICANT CHANGE UP (ref 43–77)
NRBC # BLD AUTO: 0 K/UL — SIGNIFICANT CHANGE UP (ref 0–0)
NRBC # FLD: 0 K/UL — SIGNIFICANT CHANGE UP (ref 0–0)
NRBC BLD AUTO-RTO: 0 /100 WBCS — SIGNIFICANT CHANGE UP (ref 0–0)
PLATELET # BLD AUTO: 311 K/UL — SIGNIFICANT CHANGE UP (ref 150–400)
PMV BLD: 10.5 FL — SIGNIFICANT CHANGE UP (ref 7–13)
POTASSIUM SERPL-MCNC: 3.8 MMOL/L — SIGNIFICANT CHANGE UP (ref 3.5–5.3)
POTASSIUM SERPL-SCNC: 3.8 MMOL/L — SIGNIFICANT CHANGE UP (ref 3.5–5.3)
PROT SERPL-MCNC: 7.7 G/DL — SIGNIFICANT CHANGE UP (ref 6–8.3)
PROTHROM AB SERPL-ACNC: 12.5 SEC — SIGNIFICANT CHANGE UP (ref 9.9–13.4)
RBC # BLD: 4.48 M/UL — SIGNIFICANT CHANGE UP (ref 3.8–5.2)
RBC # FLD: 12.5 % — SIGNIFICANT CHANGE UP (ref 10.3–14.5)
SODIUM SERPL-SCNC: 140 MMOL/L — SIGNIFICANT CHANGE UP (ref 135–145)
WBC # BLD: 7.19 K/UL — SIGNIFICANT CHANGE UP (ref 3.8–10.5)
WBC # FLD AUTO: 7.19 K/UL — SIGNIFICANT CHANGE UP (ref 3.8–10.5)

## 2025-09-04 PROCEDURE — 99285 EMERGENCY DEPT VISIT HI MDM: CPT | Mod: 25

## 2025-09-04 PROCEDURE — 70498 CT ANGIOGRAPHY NECK: CPT

## 2025-09-04 PROCEDURE — 70450 CT HEAD/BRAIN W/O DYE: CPT

## 2025-09-04 PROCEDURE — 93005 ELECTROCARDIOGRAM TRACING: CPT

## 2025-09-04 PROCEDURE — 70498 CT ANGIOGRAPHY NECK: CPT | Mod: 26

## 2025-09-04 PROCEDURE — 93010 ELECTROCARDIOGRAM REPORT: CPT

## 2025-09-04 PROCEDURE — 70450 CT HEAD/BRAIN W/O DYE: CPT | Mod: 26,59

## 2025-09-04 PROCEDURE — 85730 THROMBOPLASTIN TIME PARTIAL: CPT

## 2025-09-04 PROCEDURE — 85610 PROTHROMBIN TIME: CPT

## 2025-09-04 PROCEDURE — 81025 URINE PREGNANCY TEST: CPT

## 2025-09-04 PROCEDURE — 70496 CT ANGIOGRAPHY HEAD: CPT | Mod: 26

## 2025-09-04 PROCEDURE — 70496 CT ANGIOGRAPHY HEAD: CPT

## 2025-09-04 PROCEDURE — 99285 EMERGENCY DEPT VISIT HI MDM: CPT

## 2025-09-04 PROCEDURE — 36415 COLL VENOUS BLD VENIPUNCTURE: CPT

## 2025-09-04 PROCEDURE — 85025 COMPLETE CBC W/AUTO DIFF WBC: CPT

## 2025-09-04 PROCEDURE — 80053 COMPREHEN METABOLIC PANEL: CPT

## 2025-09-04 RX ORDER — LABETALOL HYDROCHLORIDE 200 MG/1
1 TABLET, FILM COATED ORAL
Refills: 0 | DISCHARGE

## 2025-09-04 RX ORDER — ERGOCALCIFEROL 1.25 MG/1
0 CAPSULE ORAL
Refills: 0 | DISCHARGE

## 2025-09-04 RX ADMIN — Medication 1000 MILLILITER(S): at 11:33
